# Patient Record
Sex: FEMALE | Race: OTHER | HISPANIC OR LATINO | URBAN - METROPOLITAN AREA
[De-identification: names, ages, dates, MRNs, and addresses within clinical notes are randomized per-mention and may not be internally consistent; named-entity substitution may affect disease eponyms.]

---

## 2017-03-21 ENCOUNTER — EMERGENCY (EMERGENCY)
Facility: HOSPITAL | Age: 62
LOS: 1 days | Discharge: HOME | End: 2017-03-21
Attending: EMERGENCY MEDICINE
Payer: COMMERCIAL

## 2017-03-21 VITALS
WEIGHT: 199.96 LBS | OXYGEN SATURATION: 97 % | RESPIRATION RATE: 18 BRPM | DIASTOLIC BLOOD PRESSURE: 117 MMHG | HEART RATE: 66 BPM | SYSTOLIC BLOOD PRESSURE: 160 MMHG | HEIGHT: 57 IN | TEMPERATURE: 98 F

## 2017-03-21 DIAGNOSIS — R21 RASH AND OTHER NONSPECIFIC SKIN ERUPTION: ICD-10-CM

## 2017-03-21 DIAGNOSIS — L03.032 CELLULITIS OF LEFT TOE: ICD-10-CM

## 2017-03-21 DIAGNOSIS — Z79.82 LONG TERM (CURRENT) USE OF ASPIRIN: ICD-10-CM

## 2017-03-21 LAB
ANION GAP SERPL CALC-SCNC: 12 MMOL/L — SIGNIFICANT CHANGE UP (ref 5–17)
BUN SERPL-MCNC: 11 MG/DL — SIGNIFICANT CHANGE UP (ref 8–20)
CALCIUM SERPL-MCNC: 9.2 MG/DL — SIGNIFICANT CHANGE UP (ref 8.6–10.2)
CHLORIDE SERPL-SCNC: 101 MMOL/L — SIGNIFICANT CHANGE UP (ref 98–107)
CO2 SERPL-SCNC: 27 MMOL/L — SIGNIFICANT CHANGE UP (ref 22–29)
CREAT SERPL-MCNC: 0.59 MG/DL — SIGNIFICANT CHANGE UP (ref 0.5–1.3)
GLUCOSE SERPL-MCNC: 102 MG/DL — SIGNIFICANT CHANGE UP (ref 70–115)
HCT VFR BLD CALC: 39.2 % — SIGNIFICANT CHANGE UP (ref 37–47)
HGB BLD-MCNC: 13 G/DL — SIGNIFICANT CHANGE UP (ref 12–16)
MCHC RBC-ENTMCNC: 28.6 PG — SIGNIFICANT CHANGE UP (ref 27–31)
MCHC RBC-ENTMCNC: 33.2 G/DL — SIGNIFICANT CHANGE UP (ref 32–36)
MCV RBC AUTO: 86.2 FL — SIGNIFICANT CHANGE UP (ref 81–99)
PLATELET # BLD AUTO: 256 K/UL — SIGNIFICANT CHANGE UP (ref 150–400)
POTASSIUM SERPL-MCNC: 4.1 MMOL/L — SIGNIFICANT CHANGE UP (ref 3.5–5.3)
POTASSIUM SERPL-SCNC: 4.1 MMOL/L — SIGNIFICANT CHANGE UP (ref 3.5–5.3)
RBC # BLD: 4.55 M/UL — SIGNIFICANT CHANGE UP (ref 4.4–5.2)
RBC # FLD: 13.3 % — SIGNIFICANT CHANGE UP (ref 11–15.6)
SODIUM SERPL-SCNC: 140 MMOL/L — SIGNIFICANT CHANGE UP (ref 135–145)
WBC # BLD: 11.9 K/UL — HIGH (ref 4.8–10.8)
WBC # FLD AUTO: 11.9 K/UL — HIGH (ref 4.8–10.8)

## 2017-03-21 PROCEDURE — 80048 BASIC METABOLIC PNL TOTAL CA: CPT

## 2017-03-21 PROCEDURE — 85027 COMPLETE CBC AUTOMATED: CPT

## 2017-03-21 PROCEDURE — 99284 EMERGENCY DEPT VISIT MOD MDM: CPT | Mod: 25

## 2017-03-21 PROCEDURE — 99284 EMERGENCY DEPT VISIT MOD MDM: CPT

## 2017-03-21 PROCEDURE — 96375 TX/PRO/DX INJ NEW DRUG ADDON: CPT

## 2017-03-21 PROCEDURE — 96374 THER/PROPH/DIAG INJ IV PUSH: CPT

## 2017-03-21 RX ORDER — CEFAZOLIN SODIUM 1 G
2000 VIAL (EA) INJECTION ONCE
Qty: 0 | Refills: 0 | Status: COMPLETED | OUTPATIENT
Start: 2017-03-21 | End: 2017-03-21

## 2017-03-21 RX ORDER — KETOROLAC TROMETHAMINE 30 MG/ML
30 SYRINGE (ML) INJECTION ONCE
Qty: 0 | Refills: 0 | Status: DISCONTINUED | OUTPATIENT
Start: 2017-03-21 | End: 2017-03-21

## 2017-03-21 RX ORDER — SODIUM CHLORIDE 9 MG/ML
3 INJECTION INTRAMUSCULAR; INTRAVENOUS; SUBCUTANEOUS ONCE
Qty: 0 | Refills: 0 | Status: COMPLETED | OUTPATIENT
Start: 2017-03-21 | End: 2017-03-21

## 2017-03-21 RX ORDER — IBUPROFEN 200 MG
1 TABLET ORAL
Qty: 16 | Refills: 0 | OUTPATIENT
Start: 2017-03-21 | End: 2017-03-25

## 2017-03-21 RX ORDER — CEPHALEXIN 500 MG
1 CAPSULE ORAL
Qty: 21 | Refills: 0 | OUTPATIENT
Start: 2017-03-21 | End: 2017-03-28

## 2017-03-21 RX ADMIN — SODIUM CHLORIDE 3 MILLILITER(S): 9 INJECTION INTRAMUSCULAR; INTRAVENOUS; SUBCUTANEOUS at 22:57

## 2017-03-21 RX ADMIN — Medication 100 MILLIGRAM(S): at 22:57

## 2017-03-21 RX ADMIN — Medication 30 MILLIGRAM(S): at 22:57

## 2017-03-21 RX ADMIN — Medication 30 MILLIGRAM(S): at 22:58

## 2017-03-21 NOTE — ED STATDOCS - NS ED MD SCRIBE ATTENDING SCRIBE SECTIONS
DISPOSITION/VITAL SIGNS( Pullset)/REVIEW OF SYSTEMS/PHYSICAL EXAM/HIV/HISTORY OF PRESENT ILLNESS/PAST MEDICAL/SURGICAL/SOCIAL HISTORY/INTAKE ASSESSMENT/SCREENINGS

## 2017-03-21 NOTE — ED STATDOCS - MUSCULOSKELETAL, MLM
Left foot with fungal nails. Heat and redness to left 4th toe extending upwards to left ankle. Lateral malleolus is swollen and tender.

## 2017-03-21 NOTE — ED STATDOCS - MEDICAL DECISION MAKING DETAILS
Pt with cellulitis to foot that is beginning to ascend to ankle. Will check labs and give IV abx. Encourage elevation to decrease swelling.

## 2017-03-21 NOTE — ED STATDOCS - OBJECTIVE STATEMENT
61 y/o F presents to the ED complaining of rash. Pt had itch to her left toes this afternoon and noted increased itching, swelling and redness to toes and left foot. She states pain radiates from left foot upwards to LLE. Pt denies new medications, recent falls, recent trauma, and animal bites. No hx of DM. NKDA. Pt was recently dx with vaginal infection and was scheduled for US today.   PCP: Dr. Funes

## 2017-03-21 NOTE — ED STATDOCS - PROGRESS NOTE DETAILS
HPI, ROS, PE done by intake doc. Orders/Plan reviewed. Pt presents today with itching, swelling and redness to toes and left foot. She states pain radiates from left foot upwards to LLE. Pt denies new medications, recent falls, recent trauma, fever, chills, nausea and animal bites. No hx of DM.  PE- Well developed, well-nourish, resting comfortably in NAD. MS: Left foot with fungal nails. Heat and redness to left 4th toe extending upwards to left ankle. Lateral malleolus is swollen and tender. Neuro: intact without focal deficits, A&Ox3, no gross sensory or motor deficits. Plan: Will f/u plan per intake physician. HPI, ROS, PE done by intake doc. Orders/Plan reviewed. Pt presents today with itching, swelling and redness to toes and left foot. She states pain radiates from left foot upwards to LLE. Pt denies new medications, recent falls, recent trauma, fever, chills, nausea and animal bites. No hx of DM.  PE- Well developed, well-nourish, resting comfortably in NAD. MS: Left foot with fungal nails. Heat and redness to left 4th toe extending upwards to left ankle. Lateral malleolus is swollen and tender. No calf tenderness. Dp and pt pulses intact, cap refill  < 2 secs. Gait intact. Neuro: intact without focal deficits, A&Ox3, no gross sensory or motor deficits. Plan: Will f/u plan per intake physician. Counseled pt and family on results. Motrin. Keflex. Area marked with marking pen. F/u with pmd within 48 hours. HPI, ROS, PE done by intake doc. Orders/Plan reviewed. Pt presents today with itching, swelling and redness to toes and left foot. She states pain radiates from left foot upwards to LLE. Pt denies new medications, recent falls, recent trauma, fever, chills, sob, cp, hp, nausea and animal bites. No hx of DM.  PE- Well developed, well-nourish, resting comfortably in NAD. MS: Left foot with fungal nails. Heat and redness to left 4th toe extending upwards to left ankle. Lateral malleolus is swollen and tender. No calf tenderness. Dp and pt pulses intact, cap refill  < 2 secs. Gait intact. Neuro: intact without focal deficits, A&Ox3, no gross sensory or motor deficits. Plan: Will f/u plan per intake physician.

## 2017-03-21 NOTE — ED STATDOCS - ATTENDING CONTRIBUTION TO CARE
I, Indigo Kaur, performed the initial face to face bedside interview with this patient regarding history of present illness, review of symptoms and relevant past medical, social and family history.  I completed an independent physical examination.  I was the initial provider who evaluated this patient. I have signed out the follow up of any pending tests (i.e. labs, radiological studies) to the ACP.  I have communicated the patient’s plan of care and disposition with the ACP.  The history, relevant review of systems, past medical and surgical history, medical decision making, and physical examination was documented by the scribe in my presence and I attest to the accuracy of the documentation.

## 2017-06-14 NOTE — ED STATDOCS - CROS ED CARDIOVAS ALL NEG
HPI Comments: Deepali Zimmer is a 34 y.o. female presents to emergency room ambulatory with  who will be interpreting from [facility] [place] to [place] at patient's request, is c/o 8/10 headache, diffuse neck, mid and lower back pain as well as vomiting x 1 this morning after a heavy door that was propped against a door fell onto her from the side, first hitting her head and her falling onto the ground on her side, with the door falling on her. The incident occurred on  while at work pushing a cart into a room when the door that was not propped against the wall, fell on her. She states she was under the door for 2 minutes at most, denies LOC and at time of event only had a dull headache. She states the next day her neck and back began to become sore and notes this morning she had 1 episode of vomiting. She denies F/C, ABD pain, CP, SOB / difficulty breathing, numbness/tingling, bowel/bladder dysfunction, hip pain, hematuria / dysuria or flank pain. Took ibuprofen with little relief. LMP- 17, denies pregnancy chance    PCP: No primary care provider on file. PMH- none  Surgical hx- c-setion    The patient has no other complaints at this time. Patient is a 34 y.o. female presenting with back pain. The history is provided by the spouse and the patient. A  was used (patient's - per patients request). Back Pain    Pertinent negatives include no chest pain, no numbness, no headaches, no abdominal pain and no dysuria. History reviewed. No pertinent past medical history. Past Surgical History:   Procedure Laterality Date    HX  SECTION      x1         History reviewed. No pertinent family history. Social History     Social History    Marital status:      Spouse name: N/A    Number of children: N/A    Years of education: N/A     Occupational History    Not on file.      Social History Main Topics    Smoking status: Never Smoker    Smokeless tobacco: Not on file    Alcohol use No    Drug use: No    Sexual activity: Not on file     Other Topics Concern    Not on file     Social History Narrative    No narrative on file         ALLERGIES: Review of patient's allergies indicates no known allergies. Review of Systems   Constitutional: Negative. Negative for activity change, chills, fatigue and unexpected weight change. Respiratory: Negative for cough, chest tightness, shortness of breath and wheezing. Cardiovascular: Negative. Negative for chest pain and palpitations. Gastrointestinal: Negative. Negative for abdominal pain, diarrhea, nausea and vomiting. Genitourinary: Negative. Negative for dysuria, flank pain, frequency and hematuria. Musculoskeletal: Positive for back pain and neck pain. Negative for arthralgias. Skin: Negative. Negative for color change and rash. Neurological: Negative. Negative for dizziness, numbness and headaches. Psychiatric/Behavioral: Negative. Negative for confusion. All other systems reviewed and are negative. Vitals:    06/14/17 1820   BP: 128/71   Pulse: 68   Resp: 16   Temp: 97.7 °F (36.5 °C)   SpO2: 100%   Weight: 61.2 kg (135 lb)   Height: 4' 11\" (1.499 m)            Physical Exam   Constitutional: She is oriented to person, place, and time. She appears well-developed and well-nourished. She is active. Non-toxic appearance. No distress. HENT:   Head: Normocephalic and atraumatic. Eyes: Conjunctivae are normal. Pupils are equal, round, and reactive to light. Right eye exhibits no discharge. Left eye exhibits no discharge. Neck: Normal range of motion and full passive range of motion without pain. Neck supple. No tracheal tenderness present. Diffuse c-spine, mid-lower t-spine and diffuse l-spine TTP. Also with R > L paracervical and trap TTP. Cardiovascular: Normal rate, regular rhythm, normal heart sounds, intact distal pulses and normal pulses.   Exam reveals no gallop and no friction rub. No murmur heard. Pulmonary/Chest: Effort normal and breath sounds normal. No respiratory distress. She has no wheezes. She has no rales. She exhibits no tenderness. Abdominal: Soft. Bowel sounds are normal. She exhibits no distension. There is no tenderness. There is no rebound and no guarding. Musculoskeletal: Normal range of motion. She exhibits no edema or tenderness. Neurological: She is alert and oriented to person, place, and time. She has normal strength. No cranial nerve deficit or sensory deficit. Coordination normal.   Strength 5/5 throughout without focal neuro deficits   Skin: Skin is warm, dry and intact. No abrasion and no rash noted. She is not diaphoretic. No erythema. No ecchymosis   Psychiatric: She has a normal mood and affect. Her speech is normal and behavior is normal. Cognition and memory are normal.   Nursing note and vitals reviewed. MDM  Number of Diagnoses or Management Options  Diagnosis management comments:   Ddx: frx, sprain, ICH, contusion, pregnancy       Amount and/or Complexity of Data Reviewed  Clinical lab tests: ordered and reviewed  Tests in the radiology section of CPT®: ordered and reviewed    Patient Progress  Patient progress: stable    ED Course       Procedures    Patient feels better after Flexeril and ibuprofen. Discussed all radiology and lab findings and all questions answered. Ranjeet Green PA-C         DISCHARGE NOTE:  21:28  The patient's results have been reviewed with them and/or available family. Patient and/or family verbally conveyed their understanding and agreement of the patient's signs, symptoms, diagnosis, treatment and prognosis and additionally agree to follow up as recommended in the discharge instructions or to return to the Emergency Room should their condition change prior to their follow-up appointment.  The patient/family verbally agrees with the care-plan and verbally conveys that all of their questions have been answered. The discharge instructions have also been provided to the patient and/or family with some educational information regarding the patient's diagnosis as well a list of reasons why the patient would want to return to the ER prior to their follow-up appointment, should their condition change. LABS COMPLETED AND REVIEWED:  Recent Results (from the past 12 hour(s))   HCG URINE, QL. - POC    Collection Time: 06/14/17  7:46 PM   Result Value Ref Range    Pregnancy test,urine (POC) NEGATIVE  NEG     URINALYSIS W/MICROSCOPIC    Collection Time: 06/14/17  8:26 PM   Result Value Ref Range    Color YELLOW/STRAW      Appearance CLOUDY (A) CLEAR      Specific gravity 1.015 1.003 - 1.030      pH (UA) 7.5 5.0 - 8.0      Protein NEGATIVE  NEG mg/dL    Glucose NEGATIVE  NEG mg/dL    Ketone NEGATIVE  NEG mg/dL    Bilirubin NEGATIVE  NEG      Blood SMALL (A) NEG      Urobilinogen 0.2 0.2 - 1.0 EU/dL    Nitrites NEGATIVE  NEG      Leukocyte Esterase TRACE (A) NEG      WBC 0-4 0 - 4 /hpf    RBC 0-5 0 - 5 /hpf    Epithelial cells FEW FEW /lpf    Bacteria 1+ (A) NEG /hpf    Hyaline cast 0-2 0 - 5 /lpf         CLINICAL IMPRESSION:  1. CHI (closed head injury), initial encounter    2. Back strain, initial encounter        Plan:  1. Head injury precautions given  2. Rx Flexeril; continue ibuprofen   3. Warm compresses, gentle stretches  4.  Return to ER if worse negative...

## 2018-04-26 ENCOUNTER — EMERGENCY (EMERGENCY)
Facility: HOSPITAL | Age: 63
LOS: 1 days | Discharge: HOME | End: 2018-04-26
Attending: EMERGENCY MEDICINE
Payer: COMMERCIAL

## 2018-04-26 VITALS
RESPIRATION RATE: 19 BRPM | SYSTOLIC BLOOD PRESSURE: 135 MMHG | TEMPERATURE: 97 F | HEART RATE: 66 BPM | DIASTOLIC BLOOD PRESSURE: 68 MMHG | OXYGEN SATURATION: 98 %

## 2018-04-26 VITALS — HEIGHT: 57 IN | WEIGHT: 199.96 LBS

## 2018-04-26 PROCEDURE — 73562 X-RAY EXAM OF KNEE 3: CPT | Mod: 26,RT

## 2018-04-26 PROCEDURE — 93970 EXTREMITY STUDY: CPT

## 2018-04-26 PROCEDURE — 99284 EMERGENCY DEPT VISIT MOD MDM: CPT | Mod: 25

## 2018-04-26 PROCEDURE — 93970 EXTREMITY STUDY: CPT | Mod: 26

## 2018-04-26 PROCEDURE — T1013: CPT

## 2018-04-26 PROCEDURE — 96372 THER/PROPH/DIAG INJ SC/IM: CPT

## 2018-04-26 PROCEDURE — 99284 EMERGENCY DEPT VISIT MOD MDM: CPT

## 2018-04-26 PROCEDURE — 73562 X-RAY EXAM OF KNEE 3: CPT

## 2018-04-26 RX ORDER — KETOROLAC TROMETHAMINE 30 MG/ML
60 SYRINGE (ML) INJECTION ONCE
Qty: 0 | Refills: 0 | Status: DISCONTINUED | OUTPATIENT
Start: 2018-04-26 | End: 2018-04-26

## 2018-04-26 RX ADMIN — Medication 60 MILLIGRAM(S): at 22:04

## 2018-04-26 NOTE — ED ADULT TRIAGE NOTE - CHIEF COMPLAINT QUOTE
Pt c/o pain to B/L knees x's 3 weeks, pt states "I got medicine 3 weeks ago and it hasn't been helping", denies trauma/fall, c/o swelling to right knee

## 2018-04-26 NOTE — ED ADULT NURSE NOTE - OBJECTIVE STATEMENT
Pt axox3 c/o b/l knee pain  x 3 weeks. Pt denies recent injury to sites. Pt states her primary had sent her to physical therapy but is having issues with insurance. Pt able to ambulate with cane.

## 2018-04-27 RX ORDER — IBUPROFEN 200 MG
1 TABLET ORAL
Qty: 16 | Refills: 0 | OUTPATIENT
Start: 2018-04-27 | End: 2018-04-30

## 2018-04-27 NOTE — ED PROVIDER NOTE - OBJECTIVE STATEMENT
62 y/o F c/o right knee pain and swelling x 3 weeks.  Patient denies trauma, injury, difficulty breathing, chest pain.  Patient is able to walk.

## 2018-04-27 NOTE — ED PROVIDER NOTE - ATTENDING CONTRIBUTION TO CARE
Pt. FROM of b/l knee. Mild tenderness to b/l knee. x-ray reviewed. I have discussed the plan with the ACP.

## 2018-05-11 ENCOUNTER — APPOINTMENT (OUTPATIENT)
Dept: ORTHOPEDIC SURGERY | Facility: CLINIC | Age: 63
End: 2018-05-11
Payer: MEDICAID

## 2018-05-11 VITALS
DIASTOLIC BLOOD PRESSURE: 79 MMHG | BODY MASS INDEX: 39.11 KG/M2 | HEART RATE: 61 BPM | HEIGHT: 59 IN | SYSTOLIC BLOOD PRESSURE: 125 MMHG | WEIGHT: 194 LBS

## 2018-05-11 PROCEDURE — 73562 X-RAY EXAM OF KNEE 3: CPT | Mod: LT

## 2018-05-11 PROCEDURE — 99204 OFFICE O/P NEW MOD 45 MIN: CPT | Mod: 25

## 2018-05-11 PROCEDURE — 20610 DRAIN/INJ JOINT/BURSA W/O US: CPT | Mod: RT

## 2018-07-09 ENCOUNTER — EMERGENCY (EMERGENCY)
Facility: HOSPITAL | Age: 63
LOS: 1 days | Discharge: HOME | End: 2018-07-09
Attending: EMERGENCY MEDICINE
Payer: COMMERCIAL

## 2018-07-09 VITALS
OXYGEN SATURATION: 95 % | SYSTOLIC BLOOD PRESSURE: 135 MMHG | WEIGHT: 199.96 LBS | RESPIRATION RATE: 17 BRPM | HEIGHT: 57 IN | HEART RATE: 71 BPM | DIASTOLIC BLOOD PRESSURE: 76 MMHG | TEMPERATURE: 99 F

## 2018-07-09 PROCEDURE — 90471 IMMUNIZATION ADMIN: CPT

## 2018-07-09 PROCEDURE — 73610 X-RAY EXAM OF ANKLE: CPT | Mod: 26,RT

## 2018-07-09 PROCEDURE — 12002 RPR S/N/AX/GEN/TRNK2.6-7.5CM: CPT

## 2018-07-09 PROCEDURE — 90715 TDAP VACCINE 7 YRS/> IM: CPT

## 2018-07-09 PROCEDURE — 73610 X-RAY EXAM OF ANKLE: CPT

## 2018-07-09 PROCEDURE — 99283 EMERGENCY DEPT VISIT LOW MDM: CPT | Mod: 25

## 2018-07-09 RX ORDER — TETANUS TOXOID, REDUCED DIPHTHERIA TOXOID AND ACELLULAR PERTUSSIS VACCINE, ADSORBED 5; 2.5; 8; 8; 2.5 [IU]/.5ML; [IU]/.5ML; UG/.5ML; UG/.5ML; UG/.5ML
0.5 SUSPENSION INTRAMUSCULAR ONCE
Qty: 0 | Refills: 0 | Status: COMPLETED | OUTPATIENT
Start: 2018-07-09 | End: 2018-07-09

## 2018-07-09 RX ORDER — IBUPROFEN 200 MG
1 TABLET ORAL
Qty: 20 | Refills: 0
Start: 2018-07-09 | End: 2018-07-13

## 2018-07-09 RX ADMIN — TETANUS TOXOID, REDUCED DIPHTHERIA TOXOID AND ACELLULAR PERTUSSIS VACCINE, ADSORBED 0.5 MILLILITER(S): 5; 2.5; 8; 8; 2.5 SUSPENSION INTRAMUSCULAR at 14:00

## 2018-07-09 NOTE — ED PROVIDER NOTE - OBJECTIVE STATEMENT
63 year old female with PMH HTn presents following fall. States she fell and struck her foot on glass coffee table, breaking glass and sustaining a cut. No numbness, tingling, weakness, fever, chills, chest pain, syncope. Did not hit head and no LOC, neck pain, numbness, tingling, weakness.

## 2018-07-09 NOTE — ED PROVIDER NOTE - MEDICAL DECISION MAKING DETAILS
Neuerovasculr intact, full ROM. Good hemostasis and cosmesis. Did nto hit head, no LOC, no neuro deficits and it was mechanical fall. Stable for dc

## 2018-07-09 NOTE — ED ADULT TRIAGE NOTE - CHIEF COMPLAINT QUOTE
patient states the glass top of a pot fell shattered and cut her right foot. patient states she became nauseous and vomited x 1 when she seen the blood.

## 2018-07-09 NOTE — ED PROVIDER NOTE - SKIN, MLM
Skin normal color for race, warm, dry and intact. No evidence of rash. 4 cm laceration just distal to medial; maleolus. Skin warm, well perfused, senation intact

## 2018-07-26 ENCOUNTER — EMERGENCY (EMERGENCY)
Facility: HOSPITAL | Age: 63
LOS: 1 days | Discharge: HOME | End: 2018-07-26
Attending: EMERGENCY MEDICINE
Payer: COMMERCIAL

## 2018-07-26 VITALS
HEART RATE: 78 BPM | DIASTOLIC BLOOD PRESSURE: 82 MMHG | OXYGEN SATURATION: 98 % | TEMPERATURE: 98 F | RESPIRATION RATE: 17 BRPM | SYSTOLIC BLOOD PRESSURE: 151 MMHG

## 2018-07-26 PROCEDURE — T1013: CPT

## 2018-07-26 PROCEDURE — G0463: CPT

## 2018-07-26 NOTE — ED ADULT NURSE NOTE - CHPI ED SYMPTOMS NEG
no red streaks/no redness/no pain/no bleeding/no fever/no inflammation/no drainage/no bleeding at site/no purulent drainage

## 2018-07-26 NOTE — ED PROVIDER NOTE - OBJECTIVE STATEMENT
64 yo female hx of htn presents for suture removal; sutures placed to right lateral foot/ankle appriox 2 weeks ago; states she planned to come back sooner to have sutures removed, but developed an "infection" for which her PMD placed her antibiotics, and now "it's all better." No complaints of pain, tenderness or drainage

## 2018-09-11 ENCOUNTER — APPOINTMENT (OUTPATIENT)
Dept: ORTHOPEDIC SURGERY | Facility: CLINIC | Age: 63
End: 2018-09-11

## 2018-09-21 ENCOUNTER — APPOINTMENT (OUTPATIENT)
Dept: ORTHOPEDIC SURGERY | Facility: CLINIC | Age: 63
End: 2018-09-21
Payer: MEDICAID

## 2018-09-21 VITALS
WEIGHT: 194 LBS | HEART RATE: 71 BPM | SYSTOLIC BLOOD PRESSURE: 122 MMHG | DIASTOLIC BLOOD PRESSURE: 75 MMHG | BODY MASS INDEX: 39.11 KG/M2 | HEIGHT: 59 IN

## 2018-09-21 DIAGNOSIS — M54.5 LOW BACK PAIN: ICD-10-CM

## 2018-09-21 DIAGNOSIS — M17.12 UNILATERAL PRIMARY OSTEOARTHRITIS, LEFT KNEE: ICD-10-CM

## 2018-09-21 DIAGNOSIS — G89.29 LOW BACK PAIN: ICD-10-CM

## 2018-09-21 DIAGNOSIS — M17.11 UNILATERAL PRIMARY OSTEOARTHRITIS, RIGHT KNEE: ICD-10-CM

## 2018-09-21 PROCEDURE — 99214 OFFICE O/P EST MOD 30 MIN: CPT

## 2018-10-12 ENCOUNTER — APPOINTMENT (OUTPATIENT)
Dept: RHEUMATOLOGY | Facility: CLINIC | Age: 63
End: 2018-10-12
Payer: MEDICAID

## 2018-10-12 VITALS
WEIGHT: 204 LBS | SYSTOLIC BLOOD PRESSURE: 124 MMHG | DIASTOLIC BLOOD PRESSURE: 76 MMHG | HEIGHT: 59 IN | TEMPERATURE: 98.2 F | HEART RATE: 61 BPM | BODY MASS INDEX: 41.12 KG/M2 | OXYGEN SATURATION: 98 % | RESPIRATION RATE: 17 BRPM

## 2018-10-12 DIAGNOSIS — Z87.19 PERSONAL HISTORY OF OTHER DISEASES OF THE DIGESTIVE SYSTEM: ICD-10-CM

## 2018-10-12 DIAGNOSIS — Z78.0 ASYMPTOMATIC MENOPAUSAL STATE: ICD-10-CM

## 2018-10-12 DIAGNOSIS — M25.50 PAIN IN UNSPECIFIED JOINT: ICD-10-CM

## 2018-10-12 DIAGNOSIS — Z63.4 DISAPPEARANCE AND DEATH OF FAMILY MEMBER: ICD-10-CM

## 2018-10-12 DIAGNOSIS — Z80.41 FAMILY HISTORY OF MALIGNANT NEOPLASM OF OVARY: ICD-10-CM

## 2018-10-12 DIAGNOSIS — Z87.898 PERSONAL HISTORY OF OTHER SPECIFIED CONDITIONS: ICD-10-CM

## 2018-10-12 DIAGNOSIS — Z86.69 PERSONAL HISTORY OF OTHER DISEASES OF THE NERVOUS SYSTEM AND SENSE ORGANS: ICD-10-CM

## 2018-10-12 DIAGNOSIS — M25.473 EFFUSION, UNSPECIFIED ANKLE: ICD-10-CM

## 2018-10-12 PROCEDURE — 99205 OFFICE O/P NEW HI 60 MIN: CPT

## 2018-10-12 SDOH — SOCIAL STABILITY - SOCIAL INSECURITY: DISSAPEARANCE AND DEATH OF FAMILY MEMBER: Z63.4

## 2018-10-14 PROBLEM — Z87.898 HISTORY OF URINARY INCONTINENCE: Status: RESOLVED | Noted: 2018-10-12 | Resolved: 2018-10-14

## 2018-10-14 PROBLEM — Z63.4 WIDOWED: Status: ACTIVE | Noted: 2018-10-14

## 2018-10-14 PROBLEM — Z80.41 FAMILY HISTORY OF MALIGNANT NEOPLASM OF OVARY: Status: ACTIVE | Noted: 2018-10-14

## 2018-10-14 PROBLEM — Z78.0 HISTORY OF MENOPAUSE: Status: RESOLVED | Noted: 2018-10-14 | Resolved: 2018-10-14

## 2018-10-14 PROBLEM — M25.473 ANKLE SWELLING: Status: ACTIVE | Noted: 2018-10-14

## 2018-10-14 PROBLEM — Z86.69 HISTORY OF SLEEP APNEA: Status: RESOLVED | Noted: 2018-10-14 | Resolved: 2018-10-14

## 2018-10-14 PROBLEM — Z87.19 HISTORY OF CONSTIPATION: Status: RESOLVED | Noted: 2018-10-12 | Resolved: 2018-10-14

## 2018-10-14 RX ORDER — MELOXICAM 15 MG/1
15 TABLET ORAL DAILY
Qty: 30 | Refills: 0 | Status: DISCONTINUED | COMMUNITY
Start: 2018-06-26 | End: 2018-10-14

## 2018-10-22 ENCOUNTER — CLINICAL ADVICE (OUTPATIENT)
Age: 63
End: 2018-10-22

## 2018-10-31 ENCOUNTER — APPOINTMENT (OUTPATIENT)
Dept: RHEUMATOLOGY | Facility: CLINIC | Age: 63
End: 2018-10-31
Payer: MEDICAID

## 2018-10-31 VITALS
TEMPERATURE: 98.7 F | SYSTOLIC BLOOD PRESSURE: 132 MMHG | BODY MASS INDEX: 41.81 KG/M2 | RESPIRATION RATE: 18 BRPM | WEIGHT: 207 LBS | DIASTOLIC BLOOD PRESSURE: 81 MMHG | OXYGEN SATURATION: 95 % | HEART RATE: 66 BPM

## 2018-10-31 DIAGNOSIS — M54.5 LOW BACK PAIN: ICD-10-CM

## 2018-10-31 DIAGNOSIS — G89.29 LOW BACK PAIN: ICD-10-CM

## 2018-10-31 PROCEDURE — 99214 OFFICE O/P EST MOD 30 MIN: CPT

## 2018-10-31 RX ORDER — ETODOLAC 400 MG/1
400 TABLET, FILM COATED, EXTENDED RELEASE ORAL
Qty: 60 | Refills: 2 | Status: DISCONTINUED | COMMUNITY
Start: 2018-10-14 | End: 2018-10-31

## 2018-11-07 ENCOUNTER — FORM ENCOUNTER (OUTPATIENT)
Age: 63
End: 2018-11-07

## 2019-01-24 ENCOUNTER — EMERGENCY (EMERGENCY)
Facility: HOSPITAL | Age: 64
LOS: 1 days | Discharge: HOME | End: 2019-01-24
Attending: EMERGENCY MEDICINE
Payer: COMMERCIAL

## 2019-01-24 VITALS
HEART RATE: 84 BPM | TEMPERATURE: 98 F | DIASTOLIC BLOOD PRESSURE: 60 MMHG | HEIGHT: 57 IN | SYSTOLIC BLOOD PRESSURE: 117 MMHG | OXYGEN SATURATION: 98 % | RESPIRATION RATE: 20 BRPM | WEIGHT: 210.1 LBS

## 2019-01-24 LAB
ALBUMIN SERPL ELPH-MCNC: 4.4 G/DL — SIGNIFICANT CHANGE UP (ref 3.3–5.2)
ALP SERPL-CCNC: 85 U/L — SIGNIFICANT CHANGE UP (ref 40–120)
ALT FLD-CCNC: 25 U/L — SIGNIFICANT CHANGE UP
ANION GAP SERPL CALC-SCNC: 14 MMOL/L — SIGNIFICANT CHANGE UP (ref 5–17)
APPEARANCE UR: CLEAR — SIGNIFICANT CHANGE UP
AST SERPL-CCNC: 25 U/L — SIGNIFICANT CHANGE UP
BACTERIA # UR AUTO: ABNORMAL
BASOPHILS # BLD AUTO: 0 K/UL — SIGNIFICANT CHANGE UP (ref 0–0.2)
BASOPHILS NFR BLD AUTO: 0.4 % — SIGNIFICANT CHANGE UP (ref 0–2)
BILIRUB SERPL-MCNC: 0.6 MG/DL — SIGNIFICANT CHANGE UP (ref 0.4–2)
BILIRUB UR-MCNC: NEGATIVE — SIGNIFICANT CHANGE UP
BUN SERPL-MCNC: 14 MG/DL — SIGNIFICANT CHANGE UP (ref 8–20)
CALCIUM SERPL-MCNC: 9.3 MG/DL — SIGNIFICANT CHANGE UP (ref 8.6–10.2)
CHLORIDE SERPL-SCNC: 102 MMOL/L — SIGNIFICANT CHANGE UP (ref 98–107)
CO2 SERPL-SCNC: 25 MMOL/L — SIGNIFICANT CHANGE UP (ref 22–29)
COLOR SPEC: YELLOW — SIGNIFICANT CHANGE UP
CREAT SERPL-MCNC: 0.75 MG/DL — SIGNIFICANT CHANGE UP (ref 0.5–1.3)
DIFF PNL FLD: ABNORMAL
EOSINOPHIL # BLD AUTO: 0.3 K/UL — SIGNIFICANT CHANGE UP (ref 0–0.5)
EOSINOPHIL NFR BLD AUTO: 2.1 % — SIGNIFICANT CHANGE UP (ref 0–6)
EPI CELLS # UR: SIGNIFICANT CHANGE UP
GLUCOSE SERPL-MCNC: 96 MG/DL — SIGNIFICANT CHANGE UP (ref 70–115)
GLUCOSE UR QL: NEGATIVE MG/DL — SIGNIFICANT CHANGE UP
HCT VFR BLD CALC: 38.3 % — SIGNIFICANT CHANGE UP (ref 37–47)
HGB BLD-MCNC: 12.4 G/DL — SIGNIFICANT CHANGE UP (ref 12–16)
KETONES UR-MCNC: NEGATIVE — SIGNIFICANT CHANGE UP
LEUKOCYTE ESTERASE UR-ACNC: ABNORMAL
LIDOCAIN IGE QN: 18 U/L — LOW (ref 22–51)
LYMPHOCYTES # BLD AUTO: 27.6 % — SIGNIFICANT CHANGE UP (ref 20–55)
LYMPHOCYTES # BLD AUTO: 3.7 K/UL — SIGNIFICANT CHANGE UP (ref 1–4.8)
MCHC RBC-ENTMCNC: 27.7 PG — SIGNIFICANT CHANGE UP (ref 27–31)
MCHC RBC-ENTMCNC: 32.4 G/DL — SIGNIFICANT CHANGE UP (ref 32–36)
MCV RBC AUTO: 85.5 FL — SIGNIFICANT CHANGE UP (ref 81–99)
MONOCYTES # BLD AUTO: 1.3 K/UL — HIGH (ref 0–0.8)
MONOCYTES NFR BLD AUTO: 9.8 % — SIGNIFICANT CHANGE UP (ref 3–10)
NEUTROPHILS # BLD AUTO: 8.1 K/UL — HIGH (ref 1.8–8)
NEUTROPHILS NFR BLD AUTO: 59.7 % — SIGNIFICANT CHANGE UP (ref 37–73)
NITRITE UR-MCNC: NEGATIVE — SIGNIFICANT CHANGE UP
PH UR: 5 — SIGNIFICANT CHANGE UP (ref 5–8)
PLATELET # BLD AUTO: 337 K/UL — SIGNIFICANT CHANGE UP (ref 150–400)
POTASSIUM SERPL-MCNC: 4.1 MMOL/L — SIGNIFICANT CHANGE UP (ref 3.5–5.3)
POTASSIUM SERPL-SCNC: 4.1 MMOL/L — SIGNIFICANT CHANGE UP (ref 3.5–5.3)
PROT SERPL-MCNC: 8.2 G/DL — SIGNIFICANT CHANGE UP (ref 6.6–8.7)
PROT UR-MCNC: 15 MG/DL
RBC # BLD: 4.48 M/UL — SIGNIFICANT CHANGE UP (ref 4.4–5.2)
RBC # FLD: 14 % — SIGNIFICANT CHANGE UP (ref 11–15.6)
RBC CASTS # UR COMP ASSIST: ABNORMAL /HPF (ref 0–4)
SODIUM SERPL-SCNC: 141 MMOL/L — SIGNIFICANT CHANGE UP (ref 135–145)
SP GR SPEC: 1.02 — SIGNIFICANT CHANGE UP (ref 1.01–1.02)
UROBILINOGEN FLD QL: NEGATIVE MG/DL — SIGNIFICANT CHANGE UP
WBC # BLD: 13.5 K/UL — HIGH (ref 4.8–10.8)
WBC # FLD AUTO: 13.5 K/UL — HIGH (ref 4.8–10.8)
WBC UR QL: ABNORMAL

## 2019-01-24 PROCEDURE — 81001 URINALYSIS AUTO W/SCOPE: CPT

## 2019-01-24 PROCEDURE — 80053 COMPREHEN METABOLIC PANEL: CPT

## 2019-01-24 PROCEDURE — 99284 EMERGENCY DEPT VISIT MOD MDM: CPT

## 2019-01-24 PROCEDURE — 74176 CT ABD & PELVIS W/O CONTRAST: CPT | Mod: 26

## 2019-01-24 PROCEDURE — 36415 COLL VENOUS BLD VENIPUNCTURE: CPT

## 2019-01-24 PROCEDURE — 85027 COMPLETE CBC AUTOMATED: CPT

## 2019-01-24 PROCEDURE — 99284 EMERGENCY DEPT VISIT MOD MDM: CPT | Mod: 25

## 2019-01-24 PROCEDURE — 83690 ASSAY OF LIPASE: CPT

## 2019-01-24 PROCEDURE — 87086 URINE CULTURE/COLONY COUNT: CPT

## 2019-01-24 PROCEDURE — 74176 CT ABD & PELVIS W/O CONTRAST: CPT

## 2019-01-24 PROCEDURE — T1013: CPT

## 2019-01-24 PROCEDURE — 96374 THER/PROPH/DIAG INJ IV PUSH: CPT

## 2019-01-24 RX ORDER — SODIUM CHLORIDE 9 MG/ML
3 INJECTION INTRAMUSCULAR; INTRAVENOUS; SUBCUTANEOUS ONCE
Qty: 0 | Refills: 0 | Status: COMPLETED | OUTPATIENT
Start: 2019-01-24 | End: 2019-01-24

## 2019-01-24 RX ORDER — KETOROLAC TROMETHAMINE 30 MG/ML
30 SYRINGE (ML) INJECTION ONCE
Qty: 0 | Refills: 0 | Status: DISCONTINUED | OUTPATIENT
Start: 2019-01-24 | End: 2019-01-24

## 2019-01-24 RX ORDER — IBUPROFEN 200 MG
1 TABLET ORAL
Qty: 20 | Refills: 0 | OUTPATIENT
Start: 2019-01-24 | End: 2019-01-28

## 2019-01-24 RX ORDER — IBUPROFEN 200 MG
600 TABLET ORAL ONCE
Qty: 0 | Refills: 0 | Status: COMPLETED | OUTPATIENT
Start: 2019-01-24 | End: 2019-01-24

## 2019-01-24 RX ADMIN — Medication 30 MILLIGRAM(S): at 19:01

## 2019-01-24 RX ADMIN — Medication 30 MILLIGRAM(S): at 18:46

## 2019-01-24 RX ADMIN — Medication 600 MILLIGRAM(S): at 21:32

## 2019-01-24 RX ADMIN — SODIUM CHLORIDE 3 MILLILITER(S): 9 INJECTION INTRAMUSCULAR; INTRAVENOUS; SUBCUTANEOUS at 18:43

## 2019-01-24 NOTE — ED STATDOCS - ATTENDING CONTRIBUTION TO CARE
I, Ihsan Maldonado, performed the initial face to face bedside interview with this patient regarding history of present illness, review of symptoms and relevant past medical, social and family history.  I completed an independent physical examination.  I was the initial provider who evaluated this patient. I have signed out the follow up of any pending tests (i.e. labs, radiological studies) to the ACP.  I have communicated the patient’s plan of care and disposition with the ACP.  The history, relevant review of systems, past medical and surgical history, medical decision making, and physical examination was documented by the scribe in my presence and I attest to the accuracy of the documentation.

## 2019-01-24 NOTE — ED ADULT NURSE NOTE - OBJECTIVE STATEMENT
assumed pt care at 1840. pt states she has had left side abdominal pain with diarrhea for 2 days. Pt denies chest pain, SOB, nausea/vomiting, dizziness, headaches. Pt A&O X4. No s/s of resp. distress noted. Safety maintained. Will continue to monitor

## 2019-01-24 NOTE — ED STATDOCS - MEDICAL DECISION MAKING DETAILS
63 y.o F with two days of left lower abdominal pain. Will check CT-abd/pel, UA, basic labs,  treat for pain and reassess. 63 y.o F with two days of left upper abdominal pain radiating to left flank. Will check CT-abd/pel, UA CBC, CMP,  treat for pain and reassess. 63 y.o F with two days of left upper abdominal pain radiating to left flank. Will check CT-renal stone hunt, UA CBC, CMP, treat for pain and reassess.

## 2019-01-24 NOTE — ED STATDOCS - PROGRESS NOTE DETAILS
PA Note: Pt evaluated by intake physician. HPI/ROS/PE as noted above. Will follow up plan per intake physician and continue to assess patient.   Additionally, no personal or FHx of kidney stones.  PCP: Antonino Rodriguez  Colonoscopy 7 years ago (normal) PA Note: No stone, uncomplicated diverticulitis and UTI. Augmentin with outpatient GI f/u. PA Note: Patient stable for discharge, reporting improvement. Vital signs stable, tolerating PO, ambulatory in ED. Discussed with patient including but not limited to results, proper medication use, and precautions/potential side effects. As discussed, patient to follow up with PMD in 24-48 hours as well as any specialists that may have been discussed/provided. Patient received copies of lab/radiology results to aid in proper outpatient follow up. Advised to seek immediate medical attention for any new/worsening signs/symptoms or concerns. Patient provided ample opportunity to ask questions which were answered to the fullest extent. Patient verbalized understanding and agreement of plan.

## 2019-01-24 NOTE — ED STATDOCS - OBJECTIVE STATEMENT
63 y.o F with PMHx HTN presents to ED c/o left lower abdominal pain for two days radiating to left lower back. Endorses increased urinary frequency. Taking ibuprofen at home with no relief. no hx of UTI, no similar symptoms in the past. Denies fever, chills, dysuria, nausea, vomiting or additional physical complaints at this time. 63 y.o F with PMHx HTN presents to ED c/o left upper abdominal pain for two days radiating to left flank. Endorses increased urinary frequency. Taking ibuprofen at home with no relief. no hx of UTI, no similar symptoms in the past. Denies fever, chills, dysuria, nausea, vomiting or additional physical complaints at this time.

## 2019-01-24 NOTE — ED STATDOCS - GASTROINTESTINAL, MLM
abdomen soft, non-tender, and non-distended. Bowel sounds present. abdomen soft, LUQ tenderness without guarding or rebound, and non-distended. Bowel sounds present.

## 2019-01-24 NOTE — ED STATDOCS - CARE PLAN
Principal Discharge DX:	Diverticulitis  Secondary Diagnosis:	Urinary tract infection with hematuria, site unspecified

## 2019-01-25 ENCOUNTER — APPOINTMENT (OUTPATIENT)
Dept: RHEUMATOLOGY | Facility: CLINIC | Age: 64
End: 2019-01-25

## 2019-01-25 LAB
CULTURE RESULTS: SIGNIFICANT CHANGE UP
SPECIMEN SOURCE: SIGNIFICANT CHANGE UP

## 2019-02-01 ENCOUNTER — APPOINTMENT (OUTPATIENT)
Dept: RHEUMATOLOGY | Facility: CLINIC | Age: 64
End: 2019-02-01
Payer: MEDICARE

## 2019-02-01 VITALS
TEMPERATURE: 98.3 F | OXYGEN SATURATION: 98 % | HEIGHT: 57 IN | HEART RATE: 60 BPM | DIASTOLIC BLOOD PRESSURE: 70 MMHG | RESPIRATION RATE: 17 BRPM | SYSTOLIC BLOOD PRESSURE: 140 MMHG | BODY MASS INDEX: 51.78 KG/M2 | WEIGHT: 240 LBS

## 2019-02-01 DIAGNOSIS — Z86.39 PERSONAL HISTORY OF OTHER ENDOCRINE, NUTRITIONAL AND METABOLIC DISEASE: ICD-10-CM

## 2019-02-01 DIAGNOSIS — Z15.89 GENETIC SUSCEPTIBILITY TO OTHER DISEASE: ICD-10-CM

## 2019-02-01 DIAGNOSIS — M85.80 OTHER SPECIFIED DISORDERS OF BONE DENSITY AND STRUCTURE, UNSPECIFIED SITE: ICD-10-CM

## 2019-02-01 DIAGNOSIS — E55.9 VITAMIN D DEFICIENCY, UNSPECIFIED: ICD-10-CM

## 2019-02-01 DIAGNOSIS — G89.29 PAIN IN RIGHT KNEE: ICD-10-CM

## 2019-02-01 DIAGNOSIS — M25.561 PAIN IN RIGHT KNEE: ICD-10-CM

## 2019-02-01 PROCEDURE — 99214 OFFICE O/P EST MOD 30 MIN: CPT

## 2019-02-03 PROBLEM — Z15.89 HLA B27 POSITIVE: Status: ACTIVE | Noted: 2018-10-31

## 2019-02-03 PROBLEM — Z86.39 HISTORY OF HYPOTHYROIDISM: Status: RESOLVED | Noted: 2019-02-03 | Resolved: 2019-02-03

## 2019-05-03 ENCOUNTER — APPOINTMENT (OUTPATIENT)
Dept: RHEUMATOLOGY | Facility: CLINIC | Age: 64
End: 2019-05-03

## 2019-05-17 ENCOUNTER — FORM ENCOUNTER (OUTPATIENT)
Age: 64
End: 2019-05-17

## 2019-09-01 ENCOUNTER — OUTPATIENT (OUTPATIENT)
Dept: OUTPATIENT SERVICES | Facility: HOSPITAL | Age: 64
LOS: 1 days | End: 2019-09-01

## 2019-09-12 ENCOUNTER — FORM ENCOUNTER (OUTPATIENT)
Age: 64
End: 2019-09-12

## 2019-09-25 ENCOUNTER — FORM ENCOUNTER (OUTPATIENT)
Age: 64
End: 2019-09-25

## 2019-09-26 ENCOUNTER — EMERGENCY (EMERGENCY)
Facility: HOSPITAL | Age: 64
LOS: 1 days | Discharge: HOME | End: 2019-09-26
Attending: EMERGENCY MEDICINE
Payer: COMMERCIAL

## 2019-09-26 VITALS
RESPIRATION RATE: 18 BRPM | HEART RATE: 56 BPM | SYSTOLIC BLOOD PRESSURE: 123 MMHG | TEMPERATURE: 98 F | OXYGEN SATURATION: 96 % | DIASTOLIC BLOOD PRESSURE: 73 MMHG

## 2019-09-26 VITALS
SYSTOLIC BLOOD PRESSURE: 115 MMHG | OXYGEN SATURATION: 99 % | RESPIRATION RATE: 20 BRPM | WEIGHT: 190.04 LBS | HEART RATE: 60 BPM | DIASTOLIC BLOOD PRESSURE: 65 MMHG | HEIGHT: 57 IN | TEMPERATURE: 98 F

## 2019-09-26 LAB
ALBUMIN SERPL ELPH-MCNC: 4.4 G/DL — SIGNIFICANT CHANGE UP (ref 3.3–5.2)
ALP SERPL-CCNC: 71 U/L — SIGNIFICANT CHANGE UP (ref 40–120)
ALT FLD-CCNC: 20 U/L — SIGNIFICANT CHANGE UP
ANION GAP SERPL CALC-SCNC: 10 MMOL/L — SIGNIFICANT CHANGE UP (ref 5–17)
AST SERPL-CCNC: 25 U/L — SIGNIFICANT CHANGE UP
BASOPHILS # BLD AUTO: 0.05 K/UL — SIGNIFICANT CHANGE UP (ref 0–0.2)
BASOPHILS NFR BLD AUTO: 0.7 % — SIGNIFICANT CHANGE UP (ref 0–2)
BILIRUB SERPL-MCNC: 0.6 MG/DL — SIGNIFICANT CHANGE UP (ref 0.4–2)
BUN SERPL-MCNC: 10 MG/DL — SIGNIFICANT CHANGE UP (ref 8–20)
CALCIUM SERPL-MCNC: 9.7 MG/DL — SIGNIFICANT CHANGE UP (ref 8.6–10.2)
CHLORIDE SERPL-SCNC: 104 MMOL/L — SIGNIFICANT CHANGE UP (ref 98–107)
CK SERPL-CCNC: 87 U/L — SIGNIFICANT CHANGE UP (ref 25–170)
CO2 SERPL-SCNC: 27 MMOL/L — SIGNIFICANT CHANGE UP (ref 22–29)
CREAT SERPL-MCNC: 0.71 MG/DL — SIGNIFICANT CHANGE UP (ref 0.5–1.3)
EOSINOPHIL # BLD AUTO: 0.14 K/UL — SIGNIFICANT CHANGE UP (ref 0–0.5)
EOSINOPHIL NFR BLD AUTO: 1.8 % — SIGNIFICANT CHANGE UP (ref 0–6)
GLUCOSE SERPL-MCNC: 93 MG/DL — SIGNIFICANT CHANGE UP (ref 70–115)
HCT VFR BLD CALC: 40.3 % — SIGNIFICANT CHANGE UP (ref 34.5–45)
HGB BLD-MCNC: 12.8 G/DL — SIGNIFICANT CHANGE UP (ref 11.5–15.5)
IMM GRANULOCYTES NFR BLD AUTO: 0.3 % — SIGNIFICANT CHANGE UP (ref 0–1.5)
LYMPHOCYTES # BLD AUTO: 2.13 K/UL — SIGNIFICANT CHANGE UP (ref 1–3.3)
LYMPHOCYTES # BLD AUTO: 28.1 % — SIGNIFICANT CHANGE UP (ref 13–44)
MCHC RBC-ENTMCNC: 27.8 PG — SIGNIFICANT CHANGE UP (ref 27–34)
MCHC RBC-ENTMCNC: 31.8 GM/DL — LOW (ref 32–36)
MCV RBC AUTO: 87.4 FL — SIGNIFICANT CHANGE UP (ref 80–100)
MONOCYTES # BLD AUTO: 0.75 K/UL — SIGNIFICANT CHANGE UP (ref 0–0.9)
MONOCYTES NFR BLD AUTO: 9.9 % — SIGNIFICANT CHANGE UP (ref 2–14)
NEUTROPHILS # BLD AUTO: 4.5 K/UL — SIGNIFICANT CHANGE UP (ref 1.8–7.4)
NEUTROPHILS NFR BLD AUTO: 59.2 % — SIGNIFICANT CHANGE UP (ref 43–77)
PLATELET # BLD AUTO: 245 K/UL — SIGNIFICANT CHANGE UP (ref 150–400)
POTASSIUM SERPL-MCNC: 4.8 MMOL/L — SIGNIFICANT CHANGE UP (ref 3.5–5.3)
POTASSIUM SERPL-SCNC: 4.8 MMOL/L — SIGNIFICANT CHANGE UP (ref 3.5–5.3)
PROT SERPL-MCNC: 8 G/DL — SIGNIFICANT CHANGE UP (ref 6.6–8.7)
RBC # BLD: 4.61 M/UL — SIGNIFICANT CHANGE UP (ref 3.8–5.2)
RBC # FLD: 13.3 % — SIGNIFICANT CHANGE UP (ref 10.3–14.5)
SODIUM SERPL-SCNC: 141 MMOL/L — SIGNIFICANT CHANGE UP (ref 135–145)
TROPONIN T SERPL-MCNC: <0.01 NG/ML — SIGNIFICANT CHANGE UP (ref 0–0.06)
WBC # BLD: 7.59 K/UL — SIGNIFICANT CHANGE UP (ref 3.8–10.5)
WBC # FLD AUTO: 7.59 K/UL — SIGNIFICANT CHANGE UP (ref 3.8–10.5)

## 2019-09-26 PROCEDURE — G0378: CPT

## 2019-09-26 PROCEDURE — 71046 X-RAY EXAM CHEST 2 VIEWS: CPT | Mod: 26

## 2019-09-26 PROCEDURE — 99220: CPT

## 2019-09-26 PROCEDURE — 75574 CT ANGIO HRT W/3D IMAGE: CPT

## 2019-09-26 PROCEDURE — 71046 X-RAY EXAM CHEST 2 VIEWS: CPT

## 2019-09-26 PROCEDURE — T1013: CPT

## 2019-09-26 PROCEDURE — 36415 COLL VENOUS BLD VENIPUNCTURE: CPT

## 2019-09-26 PROCEDURE — 93010 ELECTROCARDIOGRAM REPORT: CPT

## 2019-09-26 PROCEDURE — 84484 ASSAY OF TROPONIN QUANT: CPT

## 2019-09-26 PROCEDURE — 99284 EMERGENCY DEPT VISIT MOD MDM: CPT

## 2019-09-26 PROCEDURE — 93005 ELECTROCARDIOGRAM TRACING: CPT

## 2019-09-26 PROCEDURE — 82550 ASSAY OF CK (CPK): CPT

## 2019-09-26 PROCEDURE — 80053 COMPREHEN METABOLIC PANEL: CPT

## 2019-09-26 PROCEDURE — 85027 COMPLETE CBC AUTOMATED: CPT

## 2019-09-26 PROCEDURE — 75574 CT ANGIO HRT W/3D IMAGE: CPT | Mod: 26

## 2019-09-26 RX ORDER — KETOROLAC TROMETHAMINE 30 MG/ML
15 SYRINGE (ML) INJECTION ONCE
Refills: 0 | Status: DISCONTINUED | OUTPATIENT
Start: 2019-09-26 | End: 2019-10-05

## 2019-09-26 RX ORDER — ASPIRIN/CALCIUM CARB/MAGNESIUM 324 MG
325 TABLET ORAL ONCE
Refills: 0 | Status: COMPLETED | OUTPATIENT
Start: 2019-09-26 | End: 2019-09-26

## 2019-09-26 RX ORDER — DIAZEPAM 5 MG
5 TABLET ORAL ONCE
Refills: 0 | Status: DISCONTINUED | OUTPATIENT
Start: 2019-09-26 | End: 2019-09-26

## 2019-09-26 RX ORDER — LISINOPRIL 2.5 MG/1
10 TABLET ORAL DAILY
Refills: 0 | Status: DISCONTINUED | OUTPATIENT
Start: 2019-09-26 | End: 2019-10-05

## 2019-09-26 RX ADMIN — Medication 325 MILLIGRAM(S): at 09:27

## 2019-09-26 RX ADMIN — Medication 5 MILLIGRAM(S): at 09:27

## 2019-09-26 NOTE — ED STATDOCS - PROGRESS NOTE DETAILS
63 yo female  hx of htn hld  hypothryroid, depressionco worsening intermittent chest pressure, non radiating, dizziness, nausea, sob over last 5d. mild pressure at this time.   PE vss nad cta bl, rrr, abd obese soft nt nd bs pos, no focal neuro signs, no LE edema, no calf tender  MDM cp ekg nsr no acute st t wave changes, pt to be brought to main ER

## 2019-09-26 NOTE — ED PROVIDER NOTE - CLINICAL SUMMARY MEDICAL DECISION MAKING FREE TEXT BOX
plan to check labs, r/o acs, pna, pt most likely appears to have muscle spasm bu has fhx and obesity. will consider cdu for chest pain work up

## 2019-09-26 NOTE — ED ADULT NURSE NOTE - ED STAT RN HANDOFF DETAILS
bedside report given to BLAINE Dawn in observation unit. Pt stable, no s/s of respiratory distress noted. NSR on monitor. Safety maintained.

## 2019-09-26 NOTE — ED CDU PROVIDER INITIAL DAY NOTE - OBJECTIVE STATEMENT
This is a 64 year old female with pmhx or HTN on Lisinopril BIB daughter c/o chest discomfort along L upper chest wall x 1 week intermittently.  She notes has not tried anything for her pain.  She reports does not smoke, does not follow up with cardiologist.  PCP Miladis Blackwell.  She denies any back pain or SOB.  She notes family history of heart disease, father passed at age 70 from MI.

## 2019-09-26 NOTE — ED CDU PROVIDER DISPOSITION NOTE - PATIENT PORTAL LINK FT
You can access the FollowMyHealth Patient Portal offered by [facility] by registering at the following website: [email or web address]. By joining [facility]’s FollowMyHealth portal, you will also be able to view your health information using other applications (apps) compatible with our system.

## 2019-09-26 NOTE — ED ADULT NURSE NOTE - OBJECTIVE STATEMENT
assumed pt care at 0920. Pt A&O X4. States she has had left side chest pain without radiation and nausea & SOB x 6 days. Pt denies any vomiting, diarrhea, blurred vision,  symptoms. No s/s of respiratory distress noted. NSR on monitor. Safety maintained. Will continue to monitor.

## 2019-09-26 NOTE — ED ADULT NURSE NOTE - NSIMPLEMENTINTERV_GEN_ALL_ED
Implemented All Universal Safety Interventions:  [place] to call system. Call bell, personal items and telephone within reach. Instruct patient to call for assistance. Room bathroom lighting operational. Non-slip footwear when patient is off stretcher. Physically safe environment: no spills, clutter or unnecessary equipment. Stretcher in lowest position, wheels locked, appropriate side rails in place.

## 2019-09-26 NOTE — ED CDU PROVIDER DISPOSITION NOTE - NSFOLLOWUPCLINICS_GEN_ALL_ED_FT
[facility]  Cardiology  [address] [place] [address]  Phone: [phone]  Fax:   Follow Up Time:

## 2019-09-26 NOTE — ED ADULT NURSE REASSESSMENT NOTE - NS ED NURSE REASSESS COMMENT FT1
Assumed care of the patient at 1130. Patient transferred to observation unit CDU 10. Patient A&Ox4. No s/s of distress. Patient placed on CM, Sinus dayron on CM. VSS. Patient states intermittent Left sided CP persists but improved since ED arrival. Patient remains NPO for CTA testing. Patient awaiting rpt trop/EKg. PIV patent. Patient in understanding of plan of care. Patient with no further questions for the nurse. Will continue to monitor.

## 2019-09-26 NOTE — ED PROVIDER NOTE - CARDIAC, MLM
Normal rate, regular rhythm.  Heart sounds S1, S2.  No murmurs, rubs or gallops. chest wall tenderness on left side+ on palpation

## 2019-09-26 NOTE — ED CDU PROVIDER INITIAL DAY NOTE - PROGRESS NOTE DETAILS
faxed over CTA form, spoke [facility] radiology tech BMI 41 MD Ildefonso you with CT scan, HR 54

## 2019-09-26 NOTE — ED CDU PROVIDER INITIAL DAY NOTE - ATTENDING CONTRIBUTION TO CARE
I, Eva Leo, performed a face to face bedside interview with this patient regarding history of present illness, review of symptoms and relevant past medical, social and family history.  I completed an independent physical examination. Medical decision making, follow-up on ordered tests (ie labs, radiologic studies) and re-evaluation of the patient's status has been communicated to the ACP.  Disposition of the patient will be based on test outcome and response to ED interventions.    63yo F with HTN since friday intermittent generalized weakness with nausea some dizziness and chest pain, not exertional. no SOB. no cardiac history. last night lasted all night. no pain now. pain worse with touch. not pleuritic. no radiation     patient for serial trop and ct coronary, cards if abnormal otherwise outpt fu

## 2019-09-26 NOTE — ED CDU PROVIDER DISPOSITION NOTE - CLINICAL COURSE
64 year old female with chest pain on L side of upper chest wall reproducible on exam, better with valium, two sets of troponins x 2 negative, EKG shows sinus dayron, patient obese, and h/o HTN, heart score 3, placed in observation had CTA negative for occlusion.  Will refer to [facility] for f/u.  Advise to take NSAIDS q 6 for chest wall pain.

## 2019-09-26 NOTE — ED CDU PROVIDER DISPOSITION NOTE - ATTENDING CONTRIBUTION TO CARE
I, Eva Leo, performed a face to face bedside interview with this patient regarding history of present illness, review of symptoms and relevant past medical, social and family history.  I completed an independent physical examination. Medical decision making, follow-up on ordered tests (ie labs, radiologic studies) and re-evaluation of the patient's status has been communicated to the ACP.  Disposition of the patient will be based on test outcome and response to ED interventions.     trop negative, no ekg changes during stay, ct coronary negative for acut4e disease. TBDC

## 2019-09-26 NOTE — ED PROVIDER NOTE - OBJECTIVE STATEMENT
63 Y/O F with h/o htn p/w chest pain for last 2 days.  Pain was persistent and had intermittent nausea and dizziness. No fever, chills, vomiting, diarrhea, constipation. Never had pain before. No headache, fall or trauma. Non smoker and no fx of cad,

## 2019-09-27 DIAGNOSIS — Z71.89 OTHER SPECIFIED COUNSELING: ICD-10-CM

## 2019-10-07 ENCOUNTER — FORM ENCOUNTER (OUTPATIENT)
Age: 64
End: 2019-10-07

## 2019-11-07 ENCOUNTER — FORM ENCOUNTER (OUTPATIENT)
Age: 64
End: 2019-11-07

## 2019-11-19 ENCOUNTER — FORM ENCOUNTER (OUTPATIENT)
Age: 64
End: 2019-11-19

## 2020-05-25 ENCOUNTER — FORM ENCOUNTER (OUTPATIENT)
Age: 65
End: 2020-05-25

## 2021-03-19 NOTE — ED ADULT NURSE NOTE - PMH
[___ Weeks Post Op] : [unfilled] weeks post op [Clean/Dry/Intact] : clean, dry and intact [Neuro Intact] : an unremarkable neurological exam [Vascular Intact] : ~T peripheral vascular exam normal [Xray (Date:___)] : [unfilled] Xray -  [Doing Well] : is doing well [Excellent Pain Control] : has excellent pain control [No Sign of Infection] : is showing no signs of infection [Chills] : no chills [Fever] : no fever [Healed] : not healed [Erythema] : not erythematous [Discharge] : absent of discharge [de-identified] : 1 week ACDF at C3-C4 and C4-C5 ACDF.  [de-identified] : 53 year old F 1 week S/p C3-C4 and C4-C5 ACDF. The pain in her BL UE and posterior neck is intermittent. There is mild pain with swallowing. These findings are expected at this time.  [de-identified] : constitutional- No acute distress\par neurologic- no LE radiculitis.\par skin- incision dry clean and intact. The incision was well healed. Suture was removed and Dermabond was applied. \par musculoskeletal - motor strength is 5/5.  [de-identified] : Xray of a cervical spine taken 03/19/2021 demonstrates C3-C4 and C4-C5 ACDF, surgical implants appear well positioned.  HTN (hypertension) [de-identified] : 53 year old F 1 week S/p C3-C4 and C4-C5 ACDF.  [de-identified] : We will refill her pain medication at this time. We discussed incisional care. The patient will follow up in three weeks for repeat clinical assessment.

## 2021-03-26 PROBLEM — Z00.00 ENCOUNTER FOR PREVENTIVE HEALTH EXAMINATION: Status: ACTIVE | Noted: 2021-03-26

## 2021-05-13 ENCOUNTER — APPOINTMENT (OUTPATIENT)
Dept: BREAST CENTER | Facility: CLINIC | Age: 66
End: 2021-05-13
Payer: MEDICAID

## 2021-05-13 VITALS
SYSTOLIC BLOOD PRESSURE: 148 MMHG | HEIGHT: 60.63 IN | HEART RATE: 68 BPM | DIASTOLIC BLOOD PRESSURE: 82 MMHG | WEIGHT: 200 LBS | BODY MASS INDEX: 38.25 KG/M2

## 2021-05-18 ENCOUNTER — EMERGENCY (EMERGENCY)
Facility: HOSPITAL | Age: 66
LOS: 1 days | Discharge: HOME | End: 2021-05-18
Attending: EMERGENCY MEDICINE
Payer: COMMERCIAL

## 2021-05-18 VITALS
DIASTOLIC BLOOD PRESSURE: 66 MMHG | OXYGEN SATURATION: 99 % | WEIGHT: 210.1 LBS | HEIGHT: 57 IN | TEMPERATURE: 98 F | RESPIRATION RATE: 20 BRPM | HEART RATE: 75 BPM | SYSTOLIC BLOOD PRESSURE: 142 MMHG

## 2021-05-18 LAB
ALBUMIN SERPL ELPH-MCNC: 4.5 G/DL — SIGNIFICANT CHANGE UP (ref 3.3–5.2)
ALP SERPL-CCNC: 69 U/L — SIGNIFICANT CHANGE UP (ref 40–120)
ALT FLD-CCNC: 29 U/L — SIGNIFICANT CHANGE UP
ANION GAP SERPL CALC-SCNC: 10 MMOL/L — SIGNIFICANT CHANGE UP (ref 5–17)
APPEARANCE UR: CLEAR — SIGNIFICANT CHANGE UP
AST SERPL-CCNC: 27 U/L — SIGNIFICANT CHANGE UP
BASOPHILS # BLD AUTO: 0.13 K/UL — SIGNIFICANT CHANGE UP (ref 0–0.2)
BASOPHILS NFR BLD AUTO: 0.9 % — SIGNIFICANT CHANGE UP (ref 0–2)
BILIRUB SERPL-MCNC: 0.6 MG/DL — SIGNIFICANT CHANGE UP (ref 0.4–2)
BILIRUB UR-MCNC: NEGATIVE — SIGNIFICANT CHANGE UP
BUN SERPL-MCNC: 7 MG/DL — LOW (ref 8–20)
CALCIUM SERPL-MCNC: 9.5 MG/DL — SIGNIFICANT CHANGE UP (ref 8.6–10.2)
CHLORIDE SERPL-SCNC: 100 MMOL/L — SIGNIFICANT CHANGE UP (ref 98–107)
CO2 SERPL-SCNC: 27 MMOL/L — SIGNIFICANT CHANGE UP (ref 22–29)
COLOR SPEC: YELLOW — SIGNIFICANT CHANGE UP
CREAT SERPL-MCNC: 0.62 MG/DL — SIGNIFICANT CHANGE UP (ref 0.5–1.3)
DIFF PNL FLD: ABNORMAL
EOSINOPHIL # BLD AUTO: 0.13 K/UL — SIGNIFICANT CHANGE UP (ref 0–0.5)
EOSINOPHIL NFR BLD AUTO: 0.9 % — SIGNIFICANT CHANGE UP (ref 0–6)
GLUCOSE SERPL-MCNC: 93 MG/DL — SIGNIFICANT CHANGE UP (ref 70–99)
GLUCOSE UR QL: NEGATIVE MG/DL — SIGNIFICANT CHANGE UP
HCT VFR BLD CALC: 42.5 % — SIGNIFICANT CHANGE UP (ref 34.5–45)
HGB BLD-MCNC: 13.8 G/DL — SIGNIFICANT CHANGE UP (ref 11.5–15.5)
KETONES UR-MCNC: NEGATIVE — SIGNIFICANT CHANGE UP
LEUKOCYTE ESTERASE UR-ACNC: NEGATIVE — SIGNIFICANT CHANGE UP
LIDOCAIN IGE QN: 72 U/L — HIGH (ref 22–51)
LYMPHOCYTES # BLD AUTO: 16.5 % — SIGNIFICANT CHANGE UP (ref 13–44)
LYMPHOCYTES # BLD AUTO: 2.3 K/UL — SIGNIFICANT CHANGE UP (ref 1–3.3)
MCHC RBC-ENTMCNC: 28.5 PG — SIGNIFICANT CHANGE UP (ref 27–34)
MCHC RBC-ENTMCNC: 32.5 GM/DL — SIGNIFICANT CHANGE UP (ref 32–36)
MCV RBC AUTO: 87.8 FL — SIGNIFICANT CHANGE UP (ref 80–100)
MONOCYTES # BLD AUTO: 1.32 K/UL — HIGH (ref 0–0.9)
MONOCYTES NFR BLD AUTO: 9.5 % — SIGNIFICANT CHANGE UP (ref 2–14)
NEUTROPHILS # BLD AUTO: 9.92 K/UL — HIGH (ref 1.8–7.4)
NEUTROPHILS NFR BLD AUTO: 70.4 % — SIGNIFICANT CHANGE UP (ref 43–77)
NITRITE UR-MCNC: NEGATIVE — SIGNIFICANT CHANGE UP
PH UR: 8 — SIGNIFICANT CHANGE UP (ref 5–8)
PLATELET # BLD AUTO: 243 K/UL — SIGNIFICANT CHANGE UP (ref 150–400)
POTASSIUM SERPL-MCNC: 4.2 MMOL/L — SIGNIFICANT CHANGE UP (ref 3.5–5.3)
POTASSIUM SERPL-SCNC: 4.2 MMOL/L — SIGNIFICANT CHANGE UP (ref 3.5–5.3)
PROT SERPL-MCNC: 8.3 G/DL — SIGNIFICANT CHANGE UP (ref 6.6–8.7)
PROT UR-MCNC: NEGATIVE MG/DL — SIGNIFICANT CHANGE UP
RBC # BLD: 4.84 M/UL — SIGNIFICANT CHANGE UP (ref 3.8–5.2)
RBC # FLD: 12.9 % — SIGNIFICANT CHANGE UP (ref 10.3–14.5)
SODIUM SERPL-SCNC: 137 MMOL/L — SIGNIFICANT CHANGE UP (ref 135–145)
SP GR SPEC: 1.01 — SIGNIFICANT CHANGE UP (ref 1.01–1.02)
UROBILINOGEN FLD QL: NEGATIVE MG/DL — SIGNIFICANT CHANGE UP
WBC # BLD: 13.91 K/UL — HIGH (ref 3.8–10.5)
WBC # FLD AUTO: 13.91 K/UL — HIGH (ref 3.8–10.5)

## 2021-05-18 PROCEDURE — 76705 ECHO EXAM OF ABDOMEN: CPT

## 2021-05-18 PROCEDURE — 99284 EMERGENCY DEPT VISIT MOD MDM: CPT | Mod: 25

## 2021-05-18 PROCEDURE — 80053 COMPREHEN METABOLIC PANEL: CPT

## 2021-05-18 PROCEDURE — 81001 URINALYSIS AUTO W/SCOPE: CPT

## 2021-05-18 PROCEDURE — 85025 COMPLETE CBC W/AUTO DIFF WBC: CPT

## 2021-05-18 PROCEDURE — 96375 TX/PRO/DX INJ NEW DRUG ADDON: CPT

## 2021-05-18 PROCEDURE — 96374 THER/PROPH/DIAG INJ IV PUSH: CPT

## 2021-05-18 PROCEDURE — 99285 EMERGENCY DEPT VISIT HI MDM: CPT

## 2021-05-18 PROCEDURE — 36415 COLL VENOUS BLD VENIPUNCTURE: CPT

## 2021-05-18 PROCEDURE — 76705 ECHO EXAM OF ABDOMEN: CPT | Mod: 26,RT

## 2021-05-18 PROCEDURE — 83690 ASSAY OF LIPASE: CPT

## 2021-05-18 RX ORDER — SODIUM CHLORIDE 9 MG/ML
1000 INJECTION INTRAMUSCULAR; INTRAVENOUS; SUBCUTANEOUS ONCE
Refills: 0 | Status: COMPLETED | OUTPATIENT
Start: 2021-05-18 | End: 2021-05-18

## 2021-05-18 RX ORDER — SUCRALFATE 1 G
10 TABLET ORAL
Qty: 200 | Refills: 0
Start: 2021-05-18 | End: 2021-05-22

## 2021-05-18 RX ORDER — OMEPRAZOLE 10 MG/1
1 CAPSULE, DELAYED RELEASE ORAL
Qty: 14 | Refills: 0
Start: 2021-05-18 | End: 2021-05-31

## 2021-05-18 RX ORDER — ONDANSETRON 8 MG/1
4 TABLET, FILM COATED ORAL ONCE
Refills: 0 | Status: COMPLETED | OUTPATIENT
Start: 2021-05-18 | End: 2021-05-18

## 2021-05-18 RX ORDER — MORPHINE SULFATE 50 MG/1
4 CAPSULE, EXTENDED RELEASE ORAL ONCE
Refills: 0 | Status: DISCONTINUED | OUTPATIENT
Start: 2021-05-18 | End: 2021-05-18

## 2021-05-18 RX ADMIN — ONDANSETRON 4 MILLIGRAM(S): 8 TABLET, FILM COATED ORAL at 11:35

## 2021-05-18 RX ADMIN — MORPHINE SULFATE 4 MILLIGRAM(S): 50 CAPSULE, EXTENDED RELEASE ORAL at 11:35

## 2021-05-18 RX ADMIN — SODIUM CHLORIDE 1000 MILLILITER(S): 9 INJECTION INTRAMUSCULAR; INTRAVENOUS; SUBCUTANEOUS at 11:32

## 2021-05-18 NOTE — ED STATDOCS - OBJECTIVE STATEMENT
57 y/o female with PMHx of HTN, thyroid c/o abdominal pain. Pt has had pain since yesterday. Pt states pain radiates to back. Pt denies fever, chest pain, urinary symptoms. Pt denies SOB. Pt denies N/V/D. Pt fully vaccinated for Covid.

## 2021-05-18 NOTE — ED STATDOCS - CONSTITUTIONAL, MLM
normal... well appearing, well nourished, and in no apparent distress. Number Of Specimens Per Diagnosis: 1

## 2021-05-18 NOTE — ED STATDOCS - ATTENDING CONTRIBUTION TO CARE
I,Benedicto Bautista MD,  performed the initial face to face bedside interview with this patient regarding history of present illness, review of symptoms and relevant past medical, social and family history.  I completed an independent physical examination.  I was the initial provider who evaluated this patient. I have signed out the follow up of any pending tests (i.e. labs, radiological studies) to the ACP.  I have communicated the patient’s plan of care and disposition with the ACP.  The history, relevant review of systems, past medical and surgical history, medical decision making, and physical examination was documented by the scribe in my presence and I attest to the accuracy of the documentation.

## 2021-05-18 NOTE — ED STATDOCS - NSFOLLOWUPINSTRUCTIONS_ED_ALL_ED_FT
- Follow up with your doctor within 2-3 days.   - Return to the ED for any new or worsening symptoms.   - Take prescribed medications as directed  - Follow-up with GI doctor within 2-4 weeks for further evaluation of symptoms    Abdominal Pain    Many things can cause abdominal pain. Many times, abdominal pain is not caused by a disease and will improve without treatment. Your health care provider will do a physical exam to determine if there is a dangerous cause of your pain; blood tests and imaging may help determine the cause of your pain. However, in many cases, no cause may be found and you may need further testing as an outpatient. Monitor your abdominal pain for any changes.     SEEK IMMEDIATE MEDICAL CARE IF YOU HAVE ANY OF THE FOLLOWING SYMPTOMS: worsening abdominal pain, uncontrollable vomiting, profuse diarrhea, inability to have bowel movements or pass gas, black or bloody stools, fever accompanying chest pain or back pain, or fainting. These symptoms may represent a serious problem that is an emergency. Do not wait to see if the symptoms will go away. Get medical help right away. Call 911 and do not drive yourself to the hospital.

## 2021-05-18 NOTE — ED STATDOCS - CARDIAC, MLM
normal rate, regular rhythm, and no murmur. [Postnasal Drip] : postnasal drip [Nasal Congestion] : nasal congestion [Dyspnea] : dyspnea [Negative] : Sleep Disorder [Cough] : no cough

## 2021-05-18 NOTE — ED STATDOCS - PATIENT PORTAL LINK FT
You can access the FollowMyHealth Patient Portal offered by [facility] by registering at the following website: [email or web address]. By joining [employer]’s FollowMyHealth portal, you will also be able to view your health information using other applications (apps) compatible with our system.

## 2021-05-18 NOTE — ED STATDOCS - PROGRESS NOTE DETAILS
PA note: Sono negative for gallstones, only showing fatty liver. Pt noting improvement in pain, tolerating PO intake. Will send ppi to pharmacy and give GI f/u. copy of results given to pt, results discussed. return precautions discussed, stable for dc. PA note: PT evaluated by intake physician. HPI/PE/ROS as noted above. Will follow up plan per intake physician

## 2021-05-18 NOTE — ED STATDOCS - CARE PROVIDER_API CALL
Romero Quintana)  Gastroenterology; Internal Medicine  [address] [place] [address]  Phone: [phone]  Fax: [phone]  Follow Up Time:

## 2021-05-27 ENCOUNTER — OUTPATIENT (OUTPATIENT)
Dept: OUTPATIENT SERVICES | Facility: HOSPITAL | Age: 66
LOS: 1 days | End: 2021-05-27
Payer: COMMERCIAL

## 2021-05-27 DIAGNOSIS — R06.02 SHORTNESS OF BREATH: ICD-10-CM

## 2021-05-27 PROCEDURE — 93018 CV STRESS TEST I&R ONLY: CPT

## 2021-05-27 PROCEDURE — 93017 CV STRESS TEST TRACING ONLY: CPT

## 2021-05-27 PROCEDURE — 93016 CV STRESS TEST SUPVJ ONLY: CPT

## 2021-09-21 ENCOUNTER — APPOINTMENT (OUTPATIENT)
Dept: UROLOGY | Facility: CLINIC | Age: 66
End: 2021-09-21
Payer: MEDICARE

## 2021-09-21 VITALS
DIASTOLIC BLOOD PRESSURE: 76 MMHG | HEIGHT: 57 IN | BODY MASS INDEX: 44.66 KG/M2 | HEART RATE: 64 BPM | WEIGHT: 207 LBS | TEMPERATURE: 98.2 F | SYSTOLIC BLOOD PRESSURE: 133 MMHG

## 2021-09-21 PROCEDURE — 99203 OFFICE O/P NEW LOW 30 MIN: CPT

## 2021-09-21 NOTE — LETTER BODY
[Dear  ___] : Dear  [unfilled], [Consult Letter:] : I had the pleasure of evaluating your patient, [unfilled]. [( Thank you for referring [unfilled] for consultation for _____ )] : Thank you for referring [unfilled] for consultation for [unfilled] [Please see my note below.] : Please see my note below. [Consult Closing:] : Thank you very much for allowing me to participate in the care of this patient.  If you have any questions, please do not hesitate to contact me. [Sincerely,] : Sincerely, [FreeTextEntry3] : Romero Sanchez MD\par  of Urology\par [facility]\par \par Offices:\par [facility] @\par [address] [place] [address]\par and\par [address], [place] [address], [address]\par and\par [address] [address]\par \par TEL: [phone]\par FAX: [phone]

## 2021-09-21 NOTE — REASON FOR VISIT
[[employer] ] : provided by [employer]   [Initial Visit ___] : [unfilled] is here today for an initial visit  for [unfilled] [Interpreters_IDNumber] : [id number] [Interpreters_FullName] : Olivia

## 2021-09-21 NOTE — ASSESSMENT
[FreeTextEntry1] : Urinary incontinence:\par Encouraged Kegel's exercise. \par Will get Urinalysis and Urine culture. \par Will get Bladder Ultrasound. \par Will consider anti cholinergic or B3 agonist. \par \par Return to office after Ultrasound.

## 2021-09-21 NOTE — REVIEW OF SYSTEMS
[Abdominal Pain] : abdominal pain [Unaware of when urine is leaking] : unaware of when urine is leaking [Negative] : Heme/Lymph [Recent Weight Loss (___ Lbs)] : recent [unfilled] ~Ulb weight loss [Recent Weight Gain (___ Lbs)] : no recent weight gain

## 2021-09-21 NOTE — HISTORY OF PRESENT ILLNESS
[FreeTextEntry1] : 65 yo female presents for urinary incontinence. \par Daytime frequency is 5-6 x or so. Nocturia of 2 x. \par Endorses urgency, urge incontinence. Changes 3 pads during the day and 1 at night. \par Denies dysuria, hematuria, lower abdominal or flank pain, fever, chills or rigors.\par

## 2021-09-22 LAB
APPEARANCE: CLEAR
BACTERIA: NEGATIVE
BILIRUBIN URINE: NEGATIVE
BLOOD URINE: NEGATIVE
COLOR: NORMAL
GLUCOSE QUALITATIVE U: NEGATIVE
HYALINE CASTS: 0 /LPF
KETONES URINE: NEGATIVE
LEUKOCYTE ESTERASE URINE: NEGATIVE
MICROSCOPIC-UA: NORMAL
NITRITE URINE: NEGATIVE
PH URINE: 6.5
PROTEIN URINE: NEGATIVE
RED BLOOD CELLS URINE: 1 /HPF
SPECIFIC GRAVITY URINE: 1.02
SQUAMOUS EPITHELIAL CELLS: 1 /HPF
UROBILINOGEN URINE: NORMAL
WHITE BLOOD CELLS URINE: 1 /HPF

## 2021-09-24 DIAGNOSIS — N39.0 URINARY TRACT INFECTION, SITE NOT SPECIFIED: ICD-10-CM

## 2021-09-27 LAB — BACTERIA UR CULT: ABNORMAL

## 2021-10-15 ENCOUNTER — OUTPATIENT (OUTPATIENT)
Dept: OUTPATIENT SERVICES | Facility: HOSPITAL | Age: 66
LOS: 1 days | End: 2021-10-15

## 2021-10-15 ENCOUNTER — APPOINTMENT (OUTPATIENT)
Dept: ULTRASOUND IMAGING | Facility: CLINIC | Age: 66
End: 2021-10-15
Payer: MEDICARE

## 2021-10-15 DIAGNOSIS — R32 UNSPECIFIED URINARY INCONTINENCE: ICD-10-CM

## 2021-10-15 PROCEDURE — 76857 US EXAM PELVIC LIMITED: CPT | Mod: 26

## 2021-11-16 ENCOUNTER — APPOINTMENT (OUTPATIENT)
Dept: UROLOGY | Facility: CLINIC | Age: 66
End: 2021-11-16
Payer: MEDICARE

## 2021-11-16 VITALS — TEMPERATURE: 98.1 F

## 2021-11-16 DIAGNOSIS — R32 UNSPECIFIED URINARY INCONTINENCE: ICD-10-CM

## 2021-11-16 PROCEDURE — 99213 OFFICE O/P EST LOW 20 MIN: CPT

## 2021-11-27 NOTE — LETTER BODY
[Dear  ___] : Dear  [unfilled], [Courtesy Letter:] : I had the pleasure of seeing your patient, [unfilled], in my office today. [Please see my note below.] : Please see my note below. [Sincerely,] : Sincerely, [FreeTextEntry3] : Romero Sanchez MD\par  of Urology\par [facility]\par \par Offices:\par [facility] @\par [address], [place] [address]\par and\par [address], [place] [address], [address]\par and\par [address] [address]\par \par TEL: [phone]\par FAX: [phone]

## 2021-11-27 NOTE — REASON FOR VISIT
[[employer] ] : provided by [employer]   [Source: ______] : History obtained from [unfilled] [Follow-up Visit ___] : a follow-up visit  for [unfilled] [Interpreters_IDNumber] : [id number] [Interpreters_FullName] : Conchita

## 2021-11-27 NOTE — HISTORY OF PRESENT ILLNESS
[FreeTextEntry1] : 67 yo female presents for follow up \par After last visit Urine culture was positive, treated with Antibiotics. \par Denies dysuria, hematuria, lower abdominal or flank pain, nausea, vomiting, fever, chills or rigors. \par Had Bladder Ultrasound. \par \par Initially seen for urinary incontinence. \par Daytime frequency is 5-6 x or so. Nocturia of 2 x. \par Endorsed urgency, urge incontinence. Changes 3 pads during the day and 1 at night. \par \par

## 2022-05-19 ENCOUNTER — APPOINTMENT (OUTPATIENT)
Dept: BREAST CENTER | Facility: CLINIC | Age: 67
End: 2022-05-19

## 2022-09-30 ENCOUNTER — EMERGENCY (EMERGENCY)
Facility: HOSPITAL | Age: 67
LOS: 1 days | Discharge: HOME | End: 2022-09-30
Attending: STUDENT IN AN ORGANIZED HEALTH CARE EDUCATION/TRAINING PROGRAM
Payer: MEDICARE

## 2022-09-30 VITALS
TEMPERATURE: 98 F | RESPIRATION RATE: 19 BRPM | SYSTOLIC BLOOD PRESSURE: 125 MMHG | HEART RATE: 70 BPM | DIASTOLIC BLOOD PRESSURE: 60 MMHG | OXYGEN SATURATION: 99 %

## 2022-09-30 VITALS
WEIGHT: 293 LBS | RESPIRATION RATE: 18 BRPM | SYSTOLIC BLOOD PRESSURE: 126 MMHG | HEART RATE: 67 BPM | HEIGHT: 57 IN | DIASTOLIC BLOOD PRESSURE: 62 MMHG | OXYGEN SATURATION: 98 % | TEMPERATURE: 98 F

## 2022-09-30 LAB
ALBUMIN SERPL ELPH-MCNC: 4.4 G/DL — SIGNIFICANT CHANGE UP (ref 3.3–5.2)
ALP SERPL-CCNC: 75 U/L — SIGNIFICANT CHANGE UP (ref 40–120)
ALT FLD-CCNC: 35 U/L — HIGH
ANION GAP SERPL CALC-SCNC: 11 MMOL/L — SIGNIFICANT CHANGE UP (ref 5–17)
AST SERPL-CCNC: 29 U/L — SIGNIFICANT CHANGE UP
BASOPHILS # BLD AUTO: 0.07 K/UL — SIGNIFICANT CHANGE UP (ref 0–0.2)
BASOPHILS NFR BLD AUTO: 0.7 % — SIGNIFICANT CHANGE UP (ref 0–2)
BILIRUB SERPL-MCNC: 0.4 MG/DL — SIGNIFICANT CHANGE UP (ref 0.4–2)
BUN SERPL-MCNC: 11.7 MG/DL — SIGNIFICANT CHANGE UP (ref 8–20)
CALCIUM SERPL-MCNC: 9.7 MG/DL — SIGNIFICANT CHANGE UP (ref 8.4–10.5)
CHLORIDE SERPL-SCNC: 102 MMOL/L — SIGNIFICANT CHANGE UP (ref 98–107)
CO2 SERPL-SCNC: 28 MMOL/L — SIGNIFICANT CHANGE UP (ref 22–29)
CREAT SERPL-MCNC: 0.69 MG/DL — SIGNIFICANT CHANGE UP (ref 0.5–1.3)
EGFR: 94 ML/MIN/1.73M2 — SIGNIFICANT CHANGE UP
EOSINOPHIL # BLD AUTO: 0.2 K/UL — SIGNIFICANT CHANGE UP (ref 0–0.5)
EOSINOPHIL NFR BLD AUTO: 2 % — SIGNIFICANT CHANGE UP (ref 0–6)
GLUCOSE SERPL-MCNC: 84 MG/DL — SIGNIFICANT CHANGE UP (ref 70–99)
HCT VFR BLD CALC: 39.9 % — SIGNIFICANT CHANGE UP (ref 34.5–45)
HGB BLD-MCNC: 13.1 G/DL — SIGNIFICANT CHANGE UP (ref 11.5–15.5)
IMM GRANULOCYTES NFR BLD AUTO: 0.4 % — SIGNIFICANT CHANGE UP (ref 0–0.9)
LIDOCAIN IGE QN: 27 U/L — SIGNIFICANT CHANGE UP (ref 22–51)
LYMPHOCYTES # BLD AUTO: 2.63 K/UL — SIGNIFICANT CHANGE UP (ref 1–3.3)
LYMPHOCYTES # BLD AUTO: 26.6 % — SIGNIFICANT CHANGE UP (ref 13–44)
MCHC RBC-ENTMCNC: 28.7 PG — SIGNIFICANT CHANGE UP (ref 27–34)
MCHC RBC-ENTMCNC: 32.8 GM/DL — SIGNIFICANT CHANGE UP (ref 32–36)
MCV RBC AUTO: 87.3 FL — SIGNIFICANT CHANGE UP (ref 80–100)
MONOCYTES # BLD AUTO: 1.1 K/UL — HIGH (ref 0–0.9)
MONOCYTES NFR BLD AUTO: 11.1 % — SIGNIFICANT CHANGE UP (ref 2–14)
NEUTROPHILS # BLD AUTO: 5.86 K/UL — SIGNIFICANT CHANGE UP (ref 1.8–7.4)
NEUTROPHILS NFR BLD AUTO: 59.2 % — SIGNIFICANT CHANGE UP (ref 43–77)
PLATELET # BLD AUTO: 234 K/UL — SIGNIFICANT CHANGE UP (ref 150–400)
POTASSIUM SERPL-MCNC: 4.3 MMOL/L — SIGNIFICANT CHANGE UP (ref 3.5–5.3)
POTASSIUM SERPL-SCNC: 4.3 MMOL/L — SIGNIFICANT CHANGE UP (ref 3.5–5.3)
PROT SERPL-MCNC: 7.7 G/DL — SIGNIFICANT CHANGE UP (ref 6.6–8.7)
RBC # BLD: 4.57 M/UL — SIGNIFICANT CHANGE UP (ref 3.8–5.2)
RBC # FLD: 13.3 % — SIGNIFICANT CHANGE UP (ref 10.3–14.5)
SODIUM SERPL-SCNC: 141 MMOL/L — SIGNIFICANT CHANGE UP (ref 135–145)
WBC # BLD: 9.9 K/UL — SIGNIFICANT CHANGE UP (ref 3.8–10.5)
WBC # FLD AUTO: 9.9 K/UL — SIGNIFICANT CHANGE UP (ref 3.8–10.5)

## 2022-09-30 PROCEDURE — 80053 COMPREHEN METABOLIC PANEL: CPT

## 2022-09-30 PROCEDURE — 96374 THER/PROPH/DIAG INJ IV PUSH: CPT | Mod: XU

## 2022-09-30 PROCEDURE — 74177 CT ABD & PELVIS W/CONTRAST: CPT | Mod: MD

## 2022-09-30 PROCEDURE — 36415 COLL VENOUS BLD VENIPUNCTURE: CPT

## 2022-09-30 PROCEDURE — 85025 COMPLETE CBC W/AUTO DIFF WBC: CPT

## 2022-09-30 PROCEDURE — 83690 ASSAY OF LIPASE: CPT

## 2022-09-30 PROCEDURE — 74177 CT ABD & PELVIS W/CONTRAST: CPT | Mod: 26,MD

## 2022-09-30 PROCEDURE — 99284 EMERGENCY DEPT VISIT MOD MDM: CPT | Mod: 25

## 2022-09-30 PROCEDURE — 99284 EMERGENCY DEPT VISIT MOD MDM: CPT

## 2022-09-30 RX ORDER — ACETAMINOPHEN 500 MG
650 TABLET ORAL ONCE
Refills: 0 | Status: COMPLETED | OUTPATIENT
Start: 2022-09-30 | End: 2022-09-30

## 2022-09-30 RX ORDER — FAMOTIDINE 10 MG/ML
20 INJECTION INTRAVENOUS ONCE
Refills: 0 | Status: COMPLETED | OUTPATIENT
Start: 2022-09-30 | End: 2022-09-30

## 2022-09-30 RX ORDER — OXYCODONE AND ACETAMINOPHEN 5; 325 MG/1; MG/1
1 TABLET ORAL
Qty: 9 | Refills: 0
Start: 2022-09-30 | End: 2022-10-02

## 2022-09-30 RX ORDER — METRONIDAZOLE 500 MG
1 TABLET ORAL
Qty: 42 | Refills: 0
Start: 2022-09-30 | End: 2022-10-13

## 2022-09-30 RX ADMIN — FAMOTIDINE 20 MILLIGRAM(S): 10 INJECTION INTRAVENOUS at 13:48

## 2022-09-30 RX ADMIN — Medication 650 MILLIGRAM(S): at 13:48

## 2022-09-30 RX ADMIN — Medication 1 TABLET(S): at 17:23

## 2022-09-30 NOTE — ED STATDOCS - NSFOLLOWUPINSTRUCTIONS_ED_ALL_ED_FT
Diverticulitis    Diverticulitis is inflammation or infection of small pouches in your colon that form when you HAVE a condition called diverticulosis. This condition can range from mild to severe potentially leading to perforation or obstructions of your colon. Symptoms include abdominal pain, fever/chills, nausea, vomiting, diarrhea, constipation, or blood in your stool. If you were prescribed an antibiotic medicine, take it as told by your health care provider. Do not stop taking the antibiotic even if you start to feel better.    SEEK IMMEDIATE MEDICAL CARE IF YOU HAVE ANY OF THE FOLLOWING SYMPTOMS: worsening abdominal pain, high fever, inability to hold down liquids or medication, black or bloody stools, inability to pass gas, lightheadedness/dizziness, or a change in mental status.     Please follow up with your doctor within 48 hours  Please follow up with gastroenterology within 4 days

## 2022-09-30 NOTE — ED STATDOCS - PROGRESS NOTE DETAILS
Results discussed with PT. Pain is well controlled. Precautions discussed. Will d/c with outpatient follow up

## 2022-09-30 NOTE — ED ADULT TRIAGE NOTE - HEIGHT IN CM
SOCIAL WORK/DISCHARGE PLANNING;  Spoke with [employer], Eric Meyer. They are still waiting insurance pre-cert and she will call as soon as they receive decision. Spoke with pt and she is aware. Answered her questions re rubi Whatley W [phone]

## 2022-09-30 NOTE — ED ADULT NURSE NOTE - OBJECTIVE STATEMENT
Patient is alert and oriented X4 from home. Patient is complains of abdominal pain that is a 7/10 at this time and has gotten worse over the past few days

## 2022-09-30 NOTE — ED STATDOCS - NS ED ATTENDING STATEMENT MOD
This was a shared visit with the RODERICK. I reviewed and verified the documentation and independently performed the documented:

## 2022-09-30 NOTE — ED STATDOCS - CLINICAL SUMMARY MEDICAL DECISION MAKING FREE TEXT BOX
Pt is well appearing, tenderness at LLQ. Will check CT to evaluate for diverticulitis, labs and reassess.

## 2022-09-30 NOTE — ED STATDOCS - ATTENDING APP SHARED VISIT CONTRIBUTION OF CARE
I, Alexa Dennis, performed a face to face bedside interview with this patient regarding history of present illness, review of symptoms and relevant past medical, social and family history.  I completed an independent physical examination. Medical decision making, follow-up on ordered tests (ie labs, radiologic studies) and re-evaluation of the patient's status has been communicated to the ACP.  Disposition of the patient will be based on test outcome and response to ED interventions.

## 2022-09-30 NOTE — ED STATDOCS - PHYSICAL EXAMINATION
Vital Signs per nursing documentation  Gen: well appearing, no acute distress  HEENT: NCAT, MMM  Cardiac: regular rate rhythm, normal S1S2  Chest: clear to auscultation bilateral, no wheezes or crackles  Abdomen: soft, Tenderness @ LLQ  non distended  Extremity: no gross deformity, good perfusion  Skin: no rash  Neuro: nonfocal neuro exam, gait steady

## 2022-09-30 NOTE — ED STATDOCS - OBJECTIVE STATEMENT
66 y/o female with pmhx HTN, Pre-DM, High cholesterol and Hypothyroid, p/w LLQ abdominal pain, worsening since last night. States having pain on and off for the past 2 month. Pt was seen at  with neg UA and sent to ED. Denies fever, n/v, chest pain, SOB, back pain, or diarrhea. Denies any abdominal surgeries in the past. No treatment PTA. Despite triage note, pt denies any leg pain.

## 2022-09-30 NOTE — ED STATDOCS - PATIENT PORTAL LINK FT
You can access the FollowMyHealth Patient Portal offered by [facility] by registering at the following website: [email or web address]. By joining [employer]’s FollowMyHealth portal, you will also be able to view your health information using other applications (apps) compatible with our system.

## 2022-10-02 ENCOUNTER — EMERGENCY (EMERGENCY)
Facility: HOSPITAL | Age: 67
LOS: 1 days | Discharge: HOME | End: 2022-10-02
Attending: EMERGENCY MEDICINE
Payer: MEDICARE

## 2022-10-02 VITALS
HEART RATE: 70 BPM | TEMPERATURE: 99 F | HEIGHT: 57 IN | SYSTOLIC BLOOD PRESSURE: 112 MMHG | DIASTOLIC BLOOD PRESSURE: 50 MMHG | WEIGHT: 250 LBS | OXYGEN SATURATION: 95 % | RESPIRATION RATE: 18 BRPM

## 2022-10-02 VITALS
RESPIRATION RATE: 17 BRPM | HEART RATE: 64 BPM | OXYGEN SATURATION: 98 % | SYSTOLIC BLOOD PRESSURE: 154 MMHG | DIASTOLIC BLOOD PRESSURE: 71 MMHG

## 2022-10-02 LAB
ALBUMIN SERPL ELPH-MCNC: 4.1 G/DL — SIGNIFICANT CHANGE UP (ref 3.3–5.2)
ALP SERPL-CCNC: 71 U/L — SIGNIFICANT CHANGE UP (ref 40–120)
ALT FLD-CCNC: 33 U/L — HIGH
ANION GAP SERPL CALC-SCNC: 11 MMOL/L — SIGNIFICANT CHANGE UP (ref 5–17)
APPEARANCE UR: CLEAR — SIGNIFICANT CHANGE UP
AST SERPL-CCNC: 38 U/L — HIGH
BACTERIA # UR AUTO: ABNORMAL
BASOPHILS # BLD AUTO: 0.06 K/UL — SIGNIFICANT CHANGE UP (ref 0–0.2)
BASOPHILS NFR BLD AUTO: 0.6 % — SIGNIFICANT CHANGE UP (ref 0–2)
BILIRUB SERPL-MCNC: 0.5 MG/DL — SIGNIFICANT CHANGE UP (ref 0.4–2)
BILIRUB UR-MCNC: NEGATIVE — SIGNIFICANT CHANGE UP
BUN SERPL-MCNC: 7.5 MG/DL — LOW (ref 8–20)
CALCIUM SERPL-MCNC: 9.2 MG/DL — SIGNIFICANT CHANGE UP (ref 8.4–10.5)
CHLORIDE SERPL-SCNC: 99 MMOL/L — SIGNIFICANT CHANGE UP (ref 98–107)
CO2 SERPL-SCNC: 27 MMOL/L — SIGNIFICANT CHANGE UP (ref 22–29)
COLOR SPEC: YELLOW — SIGNIFICANT CHANGE UP
CREAT SERPL-MCNC: 0.72 MG/DL — SIGNIFICANT CHANGE UP (ref 0.5–1.3)
DIFF PNL FLD: NEGATIVE — SIGNIFICANT CHANGE UP
EGFR: 91 ML/MIN/1.73M2 — SIGNIFICANT CHANGE UP
EOSINOPHIL # BLD AUTO: 0.17 K/UL — SIGNIFICANT CHANGE UP (ref 0–0.5)
EOSINOPHIL NFR BLD AUTO: 1.7 % — SIGNIFICANT CHANGE UP (ref 0–6)
EPI CELLS # UR: SIGNIFICANT CHANGE UP
GLUCOSE SERPL-MCNC: 85 MG/DL — SIGNIFICANT CHANGE UP (ref 70–99)
GLUCOSE UR QL: NEGATIVE MG/DL — SIGNIFICANT CHANGE UP
HCT VFR BLD CALC: 39.7 % — SIGNIFICANT CHANGE UP (ref 34.5–45)
HGB BLD-MCNC: 13.1 G/DL — SIGNIFICANT CHANGE UP (ref 11.5–15.5)
HIV 1 & 2 AB SERPL IA.RAPID: SIGNIFICANT CHANGE UP
IMM GRANULOCYTES NFR BLD AUTO: 0.5 % — SIGNIFICANT CHANGE UP (ref 0–0.9)
KETONES UR-MCNC: NEGATIVE — SIGNIFICANT CHANGE UP
LACTATE BLDV-MCNC: 0.8 MMOL/L — SIGNIFICANT CHANGE UP (ref 0.5–2)
LEUKOCYTE ESTERASE UR-ACNC: ABNORMAL
LIDOCAIN IGE QN: 19 U/L — LOW (ref 22–51)
LYMPHOCYTES # BLD AUTO: 2.63 K/UL — SIGNIFICANT CHANGE UP (ref 1–3.3)
LYMPHOCYTES # BLD AUTO: 26.1 % — SIGNIFICANT CHANGE UP (ref 13–44)
MCHC RBC-ENTMCNC: 28.7 PG — SIGNIFICANT CHANGE UP (ref 27–34)
MCHC RBC-ENTMCNC: 33 GM/DL — SIGNIFICANT CHANGE UP (ref 32–36)
MCV RBC AUTO: 87.1 FL — SIGNIFICANT CHANGE UP (ref 80–100)
MONOCYTES # BLD AUTO: 1.18 K/UL — HIGH (ref 0–0.9)
MONOCYTES NFR BLD AUTO: 11.7 % — SIGNIFICANT CHANGE UP (ref 2–14)
NEUTROPHILS # BLD AUTO: 6 K/UL — SIGNIFICANT CHANGE UP (ref 1.8–7.4)
NEUTROPHILS NFR BLD AUTO: 59.4 % — SIGNIFICANT CHANGE UP (ref 43–77)
NITRITE UR-MCNC: NEGATIVE — SIGNIFICANT CHANGE UP
PH UR: 8 — SIGNIFICANT CHANGE UP (ref 5–8)
PLATELET # BLD AUTO: 238 K/UL — SIGNIFICANT CHANGE UP (ref 150–400)
POTASSIUM SERPL-MCNC: 4.5 MMOL/L — SIGNIFICANT CHANGE UP (ref 3.5–5.3)
POTASSIUM SERPL-SCNC: 4.5 MMOL/L — SIGNIFICANT CHANGE UP (ref 3.5–5.3)
PROT SERPL-MCNC: 7.8 G/DL — SIGNIFICANT CHANGE UP (ref 6.6–8.7)
PROT UR-MCNC: NEGATIVE — SIGNIFICANT CHANGE UP
RBC # BLD: 4.56 M/UL — SIGNIFICANT CHANGE UP (ref 3.8–5.2)
RBC # FLD: 13.1 % — SIGNIFICANT CHANGE UP (ref 10.3–14.5)
RBC CASTS # UR COMP ASSIST: SIGNIFICANT CHANGE UP /HPF (ref 0–4)
SODIUM SERPL-SCNC: 137 MMOL/L — SIGNIFICANT CHANGE UP (ref 135–145)
SP GR SPEC: 1.01 — SIGNIFICANT CHANGE UP (ref 1.01–1.02)
UROBILINOGEN FLD QL: NEGATIVE MG/DL — SIGNIFICANT CHANGE UP
WBC # BLD: 10.09 K/UL — SIGNIFICANT CHANGE UP (ref 3.8–10.5)
WBC # FLD AUTO: 10.09 K/UL — SIGNIFICANT CHANGE UP (ref 3.8–10.5)
WBC UR QL: SIGNIFICANT CHANGE UP /HPF (ref 0–5)

## 2022-10-02 PROCEDURE — 99285 EMERGENCY DEPT VISIT HI MDM: CPT

## 2022-10-02 RX ORDER — SODIUM CHLORIDE 9 MG/ML
1000 INJECTION INTRAMUSCULAR; INTRAVENOUS; SUBCUTANEOUS ONCE
Refills: 0 | Status: COMPLETED | OUTPATIENT
Start: 2022-10-02 | End: 2022-10-02

## 2022-10-02 RX ORDER — MORPHINE SULFATE 50 MG/1
4 CAPSULE, EXTENDED RELEASE ORAL ONCE
Refills: 0 | Status: DISCONTINUED | OUTPATIENT
Start: 2022-10-02 | End: 2022-10-02

## 2022-10-02 RX ORDER — ONDANSETRON 8 MG/1
4 TABLET, FILM COATED ORAL ONCE
Refills: 0 | Status: COMPLETED | OUTPATIENT
Start: 2022-10-02 | End: 2022-10-02

## 2022-10-02 RX ORDER — CIPROFLOXACIN LACTATE 400MG/40ML
400 VIAL (ML) INTRAVENOUS ONCE
Refills: 0 | Status: COMPLETED | OUTPATIENT
Start: 2022-10-02 | End: 2022-10-02

## 2022-10-02 RX ORDER — METRONIDAZOLE 500 MG
500 TABLET ORAL ONCE
Refills: 0 | Status: COMPLETED | OUTPATIENT
Start: 2022-10-02 | End: 2022-10-02

## 2022-10-02 RX ADMIN — MORPHINE SULFATE 4 MILLIGRAM(S): 50 CAPSULE, EXTENDED RELEASE ORAL at 20:29

## 2022-10-02 RX ADMIN — ONDANSETRON 4 MILLIGRAM(S): 8 TABLET, FILM COATED ORAL at 20:33

## 2022-10-02 RX ADMIN — SODIUM CHLORIDE 1000 MILLILITER(S): 9 INJECTION INTRAMUSCULAR; INTRAVENOUS; SUBCUTANEOUS at 17:46

## 2022-10-02 RX ADMIN — Medication 100 MILLIGRAM(S): at 20:36

## 2022-10-02 RX ADMIN — Medication 200 MILLIGRAM(S): at 17:47

## 2022-10-02 NOTE — ED ADULT TRIAGE NOTE - CHIEF COMPLAINT QUOTE
pt c/o left side low abdominal pain, started a few months ago, pain is worse  A&Ox3, resp wnl, was seen here recently

## 2022-10-02 NOTE — ED STATDOCS - PATIENT PORTAL LINK FT
You can access the FollowMyHealth Patient Portal offered by [facility] by registering at the following website: [email or web address]. By joining [employer]’s FollowMyHealth portal, you will also be able to view your health information using other applications (apps) compatible with our system.

## 2022-10-02 NOTE — ED ADULT NURSE REASSESSMENT NOTE - NS ED NURSE REASSESS COMMENT FT1
pt resting in stretcher, awaiting CT scan. respirations even and unlabored. pt shows no s/s of acute distress at this time. safety precautions maintained.

## 2022-10-02 NOTE — ED ADULT NURSE REASSESSMENT NOTE - NS ED NURSE REASSESS COMMENT FT1
assumed care for pt 1930. pt states she is in 9/10 abd pain. no other complaints at this time. respirations even and unlabored. pt shows no s/s of acute distress at this time. safety precautions maintained.

## 2022-10-02 NOTE — ED STATDOCS - EYES, MLM
clear bilaterally.  Pupils equal, round, and reactive to light. No scleral icterus. No scleral icterus.

## 2022-10-02 NOTE — ED STATDOCS - CLINICAL SUMMARY MEDICAL DECISION MAKING FREE TEXT BOX
Recent diagnosis of uncomplicated sigmoid diverticulitis on Friday with worsening pain, repeat labs and CAT scan. Recent diagnosis of uncomplicated sigmoid diverticulitis on Friday with worsening pain despite oral antibiotics: repeat labs and CAT scan.  IV cipro and flagyl.

## 2022-10-02 NOTE — ED STATDOCS - OBJECTIVE STATEMENT
68 y/o female with PMHx of hyperthyroid, HTN, pre-DM presents after being seen here Friday, states the medicine for pain is not working. Pt states when she walks, she has LLQ pain. Pain started a few months ago, but exacerbated Friday morning, prompting prior visit. Pain is constant and does not radiate. Denies n/v, chills, fever. Last BM this morning, normal. Pt reports 2 years ago she had diverticulitis diagnosis, has not seen GI specialist. Last colonoscopy was 1 year ago, results normal. No allergies to meds, pt is non-smoker. On Augmentin 3x a day for sigmoid diverticulitis dx on Friday. Intermittent pain in LLQ for the past month. reports strong pain since friday morning:  LLQ, no radiation without n/v or fever.  Seen in ED on friday and diagnosed with mild uncomplicated acute sigmoid diverticulitis.  Taking augmentin three times a day and percocet but reports ongoing discomfort.  Reports difficulty standing up straight.  Denies fever or nausea at present.  H/o prior diverticulitis.  Last colonoscopy 1 year ago and normal. Intermittent pain in LLQ for the past month. reports strong pain since Friday morning:  LLQ, no radiation without n/v or fever.  Seen in ED on Friday and diagnosed with mild uncomplicated acute sigmoid diverticulitis.  Taking Augmentin three times a day and percocet but reports ongoing discomfort.  Reports difficulty standing up straight.  Denies fever or nausea at present.  H/o prior diverticulitis.  Last colonoscopy 1 year ago and normal.

## 2022-10-02 NOTE — ED ADULT TRIAGE NOTE - WEIGHT IN LBS
250 Non-Graft Cartilage Fenestration Text: The cartilage was fenestrated with a 2mm punch biopsy to help facilitate healing.

## 2022-10-02 NOTE — ED STATDOCS - PROGRESS NOTE DETAILS
Patient receiving IV hydration and antibiotics. She reports of continued pain. No worsening of condition. Pending CT scan. Will continue to follow. history and physical performed by intake physician - results reviewed and case discussed with attending

## 2022-10-02 NOTE — ED STATDOCS - NS_ ATTENDINGSCRIBEDETAILS _ED_A_ED_FT
I, Lizandro Pickard, performed the initial face to face bedside interview with this patient regarding history of present illness, review of symptoms and relevant past medical, social and family history.  I completed an independent physical examination.  I was the provider who initially evaluated this patient.  The history, relevant review of systems, past medical and surgical history, medical decision making, and physical examination was documented by the scribe in my presence and I attest to the accuracy of the documentation. Follow-up on ordered tests (ie labs, radiologic studies) and re-evaluation of the patient's status has been communicated to the ACP.  Disposition of the patient will be based on test outcome and response to ED interventions.

## 2022-10-03 PROCEDURE — 80053 COMPREHEN METABOLIC PANEL: CPT

## 2022-10-03 PROCEDURE — 81001 URINALYSIS AUTO W/SCOPE: CPT

## 2022-10-03 PROCEDURE — 85025 COMPLETE CBC W/AUTO DIFF WBC: CPT

## 2022-10-03 PROCEDURE — 83690 ASSAY OF LIPASE: CPT

## 2022-10-03 PROCEDURE — G1004: CPT

## 2022-10-03 PROCEDURE — 74177 CT ABD & PELVIS W/CONTRAST: CPT | Mod: ME

## 2022-10-03 PROCEDURE — 96374 THER/PROPH/DIAG INJ IV PUSH: CPT | Mod: XU

## 2022-10-03 PROCEDURE — 74177 CT ABD & PELVIS W/CONTRAST: CPT | Mod: 26,ME

## 2022-10-03 PROCEDURE — 96375 TX/PRO/DX INJ NEW DRUG ADDON: CPT

## 2022-10-03 PROCEDURE — 83605 ASSAY OF LACTIC ACID: CPT

## 2022-10-03 PROCEDURE — 36415 COLL VENOUS BLD VENIPUNCTURE: CPT

## 2022-10-03 PROCEDURE — 86703 HIV-1/HIV-2 1 RESULT ANTBDY: CPT

## 2022-10-03 PROCEDURE — 99284 EMERGENCY DEPT VISIT MOD MDM: CPT | Mod: 25

## 2022-11-22 ENCOUNTER — OFFICE (OUTPATIENT)
Dept: URBAN - METROPOLITAN AREA CLINIC 94 | Facility: CLINIC | Age: 67
Setting detail: OPHTHALMOLOGY
End: 2022-11-22
Payer: MEDICAID

## 2022-11-22 DIAGNOSIS — H35.373: ICD-10-CM

## 2022-11-22 DIAGNOSIS — H33.312: ICD-10-CM

## 2022-11-22 DIAGNOSIS — H35.033: ICD-10-CM

## 2022-11-22 DIAGNOSIS — H43.812: ICD-10-CM

## 2022-11-22 PROBLEM — H40.052 OCULAR HYPERTENSION; LEFT EYE: Status: ACTIVE | Noted: 2022-11-22

## 2022-11-22 PROCEDURE — 92235 FLUORESCEIN ANGRPH MLTIFRAME: CPT | Performed by: OPHTHALMOLOGY

## 2022-11-22 PROCEDURE — 92134 CPTRZ OPH DX IMG PST SGM RTA: CPT | Performed by: OPHTHALMOLOGY

## 2022-11-22 PROCEDURE — 92012 INTRM OPH EXAM EST PATIENT: CPT | Performed by: OPHTHALMOLOGY

## 2022-11-22 ASSESSMENT — PACHYMETRY
OS_CT_UM: 523
OD_CT_UM: 516
OS_CT_CORRECTION: 1
OD_CT_CORRECTION: 2

## 2022-11-22 ASSESSMENT — SUPERFICIAL PUNCTATE KERATITIS (SPK)
OD_SPK: 1+
OS_SPK: 1+

## 2022-11-22 ASSESSMENT — REFRACTION_AUTOREFRACTION
OS_AXIS: 092
OS_SPHERE: +1.00
OS_CYLINDER: -1.50
OD_SPHERE: -1.00
OD_AXIS: 108
OD_CYLINDER: -3.25

## 2022-11-22 ASSESSMENT — KERATOMETRY
OD_AXISANGLE_DEGREES: 018
OS_K2POWER_DIOPTERS: 43.50
OS_K1POWER_DIOPTERS: 42.50
OD_K1POWER_DIOPTERS: 42.00
OS_AXISANGLE_DEGREES: 177
METHOD_AUTO_MANUAL: AUTO
OD_K2POWER_DIOPTERS: 45.00

## 2022-11-22 ASSESSMENT — VISUAL ACUITY
OD_BCVA: 20/20
OS_BCVA: 20/30

## 2022-11-22 ASSESSMENT — CONFRONTATIONAL VISUAL FIELD TEST (CVF)
OS_FINDINGS: FULL
OD_FINDINGS: FULL

## 2022-11-22 ASSESSMENT — AXIALLENGTH_DERIVED
OS_AL: 23.6785
OD_AL: 24.661

## 2022-11-22 ASSESSMENT — LID EXAM ASSESSMENTS
OD_BLEPHARITIS: RLL 1+
OS_BLEPHARITIS: LLL 1+

## 2022-11-22 ASSESSMENT — SPHEQUIV_DERIVED
OD_SPHEQUIV: -2.625
OS_SPHEQUIV: 0.25

## 2022-11-22 ASSESSMENT — TEAR BREAK UP TIME (TBUT)
OD_TBUT: T
OS_TBUT: T

## 2022-11-22 ASSESSMENT — TONOMETRY
OD_IOP_MMHG: 17
OS_IOP_MMHG: 18

## 2022-11-25 ENCOUNTER — APPOINTMENT (OUTPATIENT)
Dept: BREAST CENTER | Facility: CLINIC | Age: 67
End: 2022-11-25

## 2022-11-25 VITALS
SYSTOLIC BLOOD PRESSURE: 145 MMHG | BODY MASS INDEX: 35.76 KG/M2 | HEIGHT: 60.63 IN | WEIGHT: 187 LBS | DIASTOLIC BLOOD PRESSURE: 85 MMHG

## 2023-01-10 NOTE — ED ADULT NURSE NOTE - CCCP TRG CHIEF CMPLNT
Subjective:       Patient ID: Katelyn Andersen Neo Mancia MD;  Location: [facility] ENDO (4TH FLR);  Service: Endoscopy;  Laterality: N/A;    COLONOSCOPY N/A 12/14/2021    Procedure: COLONOSCOPY;  Surgeon: Neo Mancia MD;  Location: [facility] ENDO (4TH FLR);  Dosc Diagnostic Provider;  Location: [facility] EP LAB;  [facility] [place], [place]

## 2023-01-25 NOTE — ED STATDOCS - DIAGNOSIS COUNSELING, MDM
conducted a detailed discussion... Libtayo Pregnancy And Lactation Text: This medication is contraindicated in pregnancy and when breast feeding.

## 2023-02-08 NOTE — ED ADULT TRIAGE NOTE - NSWEIGHTCALCTOOLDRUG_GEN_A_CORE
Discharge instructions given with family at the bedside.  Pt and family verbalized understanding of discharge instructions  used

## 2023-02-17 ENCOUNTER — OFFICE (OUTPATIENT)
Dept: URBAN - METROPOLITAN AREA CLINIC 94 | Facility: CLINIC | Age: 68
Setting detail: OPHTHALMOLOGY
End: 2023-02-17
Payer: COMMERCIAL

## 2023-02-17 DIAGNOSIS — H01.005: ICD-10-CM

## 2023-02-17 DIAGNOSIS — H01.002: ICD-10-CM

## 2023-02-17 DIAGNOSIS — H40.31X2: ICD-10-CM

## 2023-02-17 DIAGNOSIS — H16.223: ICD-10-CM

## 2023-02-17 PROBLEM — H40.052 OCULAR HYPERTENSION; LEFT EYE: Status: ACTIVE | Noted: 2023-02-17

## 2023-02-17 PROCEDURE — 99213 OFFICE O/P EST LOW 20 MIN: CPT | Performed by: OPHTHALMOLOGY

## 2023-02-17 PROCEDURE — 92083 EXTENDED VISUAL FIELD XM: CPT | Performed by: OPHTHALMOLOGY

## 2023-02-17 PROCEDURE — 92133 CPTRZD OPH DX IMG PST SGM ON: CPT | Performed by: OPHTHALMOLOGY

## 2023-02-17 PROCEDURE — 83861 MICROFLUID ANALY TEARS: CPT | Performed by: OPHTHALMOLOGY

## 2023-02-17 ASSESSMENT — REFRACTION_CURRENTRX
OD_VPRISM_DIRECTION: BF
OS_OVR_VA: 20/
OD_CYLINDER: -2.25
OD_ADD: +2.50
OS_AXIS: 079
OS_ADD: +3.00
OS_SPHERE: +0.50
OS_OVR_VA: 20/
OD_AXIS: 102
OS_VPRISM_DIRECTION: BF
OS_CYLINDER: -1.25
OD_OVR_VA: 20/
OD_SPHERE: -1.50
OD_OVR_VA: 20/

## 2023-02-17 ASSESSMENT — PACHYMETRY
OS_CT_UM: 523
OS_CT_CORRECTION: 1
OD_CT_UM: 516
OD_CT_CORRECTION: 2

## 2023-02-17 ASSESSMENT — TEAR BREAK UP TIME (TBUT)
OS_TBUT: T
OD_TBUT: T

## 2023-02-17 ASSESSMENT — CONFRONTATIONAL VISUAL FIELD TEST (CVF)
OS_FINDINGS: FULL
OD_FINDINGS: FULL

## 2023-02-17 ASSESSMENT — TONOMETRY
OD_IOP_MMHG: 15
OS_IOP_MMHG: 18
OS_IOP_MMHG: 18
OD_IOP_MMHG: 16

## 2023-02-17 ASSESSMENT — AXIALLENGTH_DERIVED
OD_AL: 24.7143
OS_AL: 23.6841

## 2023-02-17 ASSESSMENT — VISUAL ACUITY
OD_BCVA: 20/20
OS_BCVA: 20/25

## 2023-02-17 ASSESSMENT — KERATOMETRY
METHOD_AUTO_MANUAL: AUTO
OD_K2POWER_DIOPTERS: 44.75
OS_K2POWER_DIOPTERS: 43.50
OS_AXISANGLE_DEGREES: 169
OD_K1POWER_DIOPTERS: 42.25
OD_AXISANGLE_DEGREES: 013
OS_K1POWER_DIOPTERS: 43.00

## 2023-02-17 ASSESSMENT — SPHEQUIV_DERIVED
OS_SPHEQUIV: 0
OD_SPHEQUIV: -2.75

## 2023-02-17 ASSESSMENT — LID EXAM ASSESSMENTS
OS_BLEPHARITIS: LLL 1+
OD_BLEPHARITIS: RLL 1+

## 2023-02-17 ASSESSMENT — SUPERFICIAL PUNCTATE KERATITIS (SPK)
OD_SPK: 1+
OS_SPK: 1+

## 2023-02-17 ASSESSMENT — REFRACTION_AUTOREFRACTION
OD_AXIS: 105
OS_SPHERE: +0.50
OD_SPHERE: -1.25
OS_CYLINDER: -1.00
OS_AXIS: 080
OD_CYLINDER: -3.00

## 2023-04-26 ENCOUNTER — APPOINTMENT (OUTPATIENT)
Dept: OPHTHALMOLOGY | Facility: CLINIC | Age: 68
End: 2023-04-26

## 2023-07-19 ENCOUNTER — NON-APPOINTMENT (OUTPATIENT)
Age: 68
End: 2023-07-19

## 2023-07-19 ENCOUNTER — APPOINTMENT (OUTPATIENT)
Dept: GASTROENTEROLOGY | Facility: CLINIC | Age: 68
End: 2023-07-19
Payer: MEDICARE

## 2023-07-19 VITALS
HEART RATE: 72 BPM | TEMPERATURE: 97.6 F | WEIGHT: 222 LBS | HEIGHT: 57 IN | RESPIRATION RATE: 16 BRPM | BODY MASS INDEX: 47.9 KG/M2 | DIASTOLIC BLOOD PRESSURE: 60 MMHG | OXYGEN SATURATION: 85 % | SYSTOLIC BLOOD PRESSURE: 123 MMHG

## 2023-07-19 DIAGNOSIS — K57.32 DIVERTICULITIS OF LARGE INTESTINE W/OUT PERFORATION OR ABSCESS W/OUT BLEEDING: ICD-10-CM

## 2023-07-19 DIAGNOSIS — Z09 ENCOUNTER FOR FOLLOW-UP EXAMINATION AFTER COMPLETED TREATMENT FOR CONDITIONS OTHER THAN MALIGNANT NEOPLASM: ICD-10-CM

## 2023-07-19 PROCEDURE — 99203 OFFICE O/P NEW LOW 30 MIN: CPT

## 2023-07-19 RX ORDER — IBUPROFEN 600 MG/1
600 TABLET, FILM COATED ORAL
Qty: 90 | Refills: 0 | Status: DISCONTINUED | COMMUNITY
End: 2023-07-19

## 2023-07-19 RX ORDER — IBUPROFEN 200 MG
600 CAPSULE ORAL
Qty: 60 | Refills: 5 | Status: DISCONTINUED | COMMUNITY
Start: 2018-10-14 | End: 2023-07-19

## 2023-07-19 RX ORDER — OMEPRAZOLE 20 MG/1
20 CAPSULE, DELAYED RELEASE ORAL DAILY
Qty: 90 | Refills: 0 | Status: DISCONTINUED | COMMUNITY
Start: 2018-10-14 | End: 2023-07-19

## 2023-07-19 RX ORDER — CIPROFLOXACIN HYDROCHLORIDE 500 MG/1
500 TABLET, FILM COATED ORAL TWICE DAILY
Qty: 14 | Refills: 0 | Status: DISCONTINUED | COMMUNITY
Start: 2021-09-24 | End: 2023-07-19

## 2023-07-19 RX ORDER — AMOXICILLIN AND CLAVULANATE POTASSIUM 250; 125 MG/1; MG/1
250-125 TABLET, FILM COATED ORAL
Refills: 0 | Status: DISCONTINUED | COMMUNITY
End: 2023-07-19

## 2023-07-19 RX ORDER — CYCLOBENZAPRINE HYDROCHLORIDE 10 MG/1
10 TABLET, FILM COATED ORAL
Qty: 30 | Refills: 2 | Status: DISCONTINUED | COMMUNITY
Start: 2018-10-14 | End: 2023-07-19

## 2023-07-19 RX ORDER — ERGOCALCIFEROL 1.25 MG/1
1.25 MG CAPSULE, LIQUID FILLED ORAL
Qty: 12 | Refills: 0 | Status: DISCONTINUED | COMMUNITY
Start: 2017-12-31 | End: 2023-07-19

## 2023-07-19 RX ORDER — FLURBIPROFEN 100 MG
100 TABLET ORAL
Qty: 180 | Refills: 0 | Status: DISCONTINUED | COMMUNITY
Start: 2018-10-31 | End: 2023-07-19

## 2023-07-19 NOTE — ASSESSMENT
[FreeTextEntry1] : We will obtain the records and ordered some additional blood work including thyroid function level.  We will check CBC and CMP.  We will schedule her for EGD and a colonoscopy at [facility].\par \par \par The bowel preparation was discussed at length. Risks (including bleeding, pain, perforation, incomplete examination, splenic laceration, adverse reactions to medications, aspiration and death), benefits and alternatives were discussed. Patient is agreeable for the colonoscopy. The patient is medically optimized for the procedure. We will schedule the patient for the procedure. Bowel preparation was sent to the pharmacy.\par \par I have also recommended to start using fiber supplementation on a regular basis to help with the constipation\par \par \par Romero Quintana MD\par Gastroenterology \par \par

## 2023-07-19 NOTE — PHYSICAL EXAM
[Alert] : alert [Normal Voice/Communication] : normal voice/communication [Healthy Appearing] : healthy appearing [No Acute Distress] : no acute distress [Sclera] : the sclera and conjunctiva were normal [Hearing Threshold Finger Rub Not [place]] : hearing was normal [Normal Lips/Gums] : the lips and gums were normal [Oropharynx] : the oropharynx was normal [Normal Appearance] : the appearance of the neck was normal [No Neck Mass] : no neck mass was observed [No Respiratory Distress] : no respiratory distress [No Acc Muscle Use] : no accessory muscle use [Respiration, Rhythm And Depth] : normal respiratory rhythm and effort [Auscultation Breath Sounds / Voice Sounds] : lungs were clear to auscultation bilaterally [Heart Rate And Rhythm] : heart rate was normal and rhythm regular [Normal S1, S2] : normal S1 and S2 [Murmurs] : no murmurs [Bowel Sounds] : normal bowel sounds [Abdomen Tenderness] : non-tender [No Masses] : no abdominal mass palpated [Abdomen Soft] : soft [] : no hepatosplenomegaly [Oriented To Time, Place, And Person] : oriented to person, place, and time

## 2023-07-19 NOTE — HISTORY OF PRESENT ILLNESS
[FreeTextEntry1] : Patient has arrived for the consultation.  The patient has been evaluated in the emergency room last year for episode of diverticulitis.  Then there was concern of epiploic appendagitis.  She has been also complaining of acid reflux.  She is not on any PPI.  Besides that she has constipation and passes stool small bowel movement per day.  She has hypothyroidism and follows regularly with her primary care doctor.  Last visit was 3 months ago.  She has denied any other complaints of any rectal bleeding or any significant weight loss.  She had a remote colonoscopy.  It is unclear whether she had any endoscopy at that time. [de-identified] :  ACC: [id number] EXAM:  CT ABDOMEN AND PELVIS IC                      \par \par PROCEDURE DATE:  10/03/2022  \par \par \par \par INTERPRETATION:  CLINICAL INFORMATION: Diverticulitis, complication \par suspected\par \par \par COMPARISON: September 30, 2022\par \par CONTRAST/COMPLICATIONS:\par IV Contrast: Omnipaque 350  97 cc administered   3 cc discarded\par Oral Contrast: NONE\par Complications: None reported at time of study completion\par \par PROCEDURE:\par CT of the Abdomen and Pelvis was performed.\par Sagittal and coronal reformats were performed.\par \par FINDINGS:\par LOWER CHEST: Mild bibasilar subsegmental atelectasis.\par \par LIVER: Within normal limits.\par BILE DUCTS: Normal caliber.\par GALLBLADDER: Within normal limits.\par SPLEEN: Within normal limits.\par PANCREAS: Within normal limits.\par ADRENALS: Within normal limits.\par KIDNEYS/URETERS: Within normal limits.\par \par BLADDER: Within normal limits.\par REPRODUCTIVE ORGANS: Uterus and adnexa within normal limits. Small \par calcified uterine fibroid on the left measuring 11 mm.\par \par Stomach is not well distended. No focal gastric mass. No gastric outlet \par obstruction.\par \par Within the fat adjacent to the left lateral abdominal wall was \par circumscribed area with central increased attenuation likely reflecting \par area of epiploic appendicitis axial 3-83/coronal 5-80.\par \par Diverticulosis descending and sigmoid portions the colon without acute \par diverticulitis. No colitis.\par \par BOWEL: No bowel obstruction. Appendix is normal.\par PERITONEUM: Small amount of fluid within the paracolic gutters \par bilaterally.\par No free air or abscess.\par VESSELS: Within normal limits.\par RETROPERITONEUM/LYMPH NODES: No lymphadenopathy.\par ABDOMINAL WALL: 2.5 cm fat-containing left inguinal hernia.\par BONES: Within normal limits.\par AI VERTEBRAL BODY ANALYSIS:\par L3: low bone density of 97 HU, suspicious for osteoporosis.\par \par IMPRESSION:\par \par Within the fat adjacent to the left lateral abdominal wall was \par circumscribed area with central increased attenuation likely reflecting \par area of epiploic appendicitis axial 3-83/coronal 5-80.\par \par Diverticulosis descending and sigmoid portions the colon without acute \par diverticulitis. No colitis.\par \par Small amount of free fluid within the paracolic gutters bilaterally\par \par No bowel obstruction, free air or abscess.\par \par VERTEBRAL BODY ANALYSIS: Low bone density as described above, consider \par further workup for osteoporosis.\par \par \par \par --- End of Report ---\par \par \par \par \par \par NETO BOOTH MD; Attending Radiologist\par This document has been electronically signed. Oct  3 2022 12:58AM\par  [de-identified] :  ACC: [id number] EXAM:  CT ABDOMEN AND PELVIS IC                      \par \par PROCEDURE DATE:  09/30/2022  \par \par \par \par INTERPRETATION:  CLINICAL INFORMATION: 67 years  Female with left lower \par quadrant pain.\par \par COMPARISON: Noncontrast CT 1/24/2019\par \par CONTRAST/COMPLICATIONS:\par IV Contrast: Omnipaque 350  85 cc administered   0 cc discarded\par Oral Contrast: NONE\par Complications: None reported at time of study completion\par \par PROCEDURE:\par CT of the Abdomen and Pelvis was performed.\par Sagittal and coronal reformats were performed.\par \par FINDINGS:\par LOWER CHEST: Motion artifact. Mild cardiomegaly.\par \par LIVER: Within normal limits.\par BILE DUCTS: Normal caliber.\par GALLBLADDER: Within normal limits.\par SPLEEN: Within normal limits.\par PANCREAS: Within normal limits.\par ADRENALS: Within normal limits.\par KIDNEYS/URETERS: Within normal limits.\par \par BLADDER: Within normal limits.\par REPRODUCTIVE ORGANS: 1 cm calcified uterine myoma.\par \par BOWEL: No bowel obstruction. Appendix is normal.. Descending and sigmoid \par colonic diverticulosis. Inflamed mid sigmoid diverticula (3:100 and 5:66) \par with mild adjacent fat stranding. No extraluminal gas. No intramural or \par intra-abdominal abscess. Small sliding hiatus hernia with mildly \par thickened distal esophagus.\par PERITONEUM: No ascites.\par VESSELS: Within normal limits.\par RETROPERITONEUM/LYMPH NODES: No lymphadenopathy.\par ABDOMINAL WALL: Small bilateral fat-containing inguinal hernias.\par BONES: Within normal limits.\par AI VERTEBRAL BODY ANALYSIS:\par L2: low bone density of 95 HU, suspicious for osteoporosis.\par L3: low bone density of 98 HU, suspicious for osteoporosis.\par \par IMPRESSION:\par \par Mild uncomplicated sigmoid diverticulitis.\par \par Small sliding hiatus hernia with mildly thickened distal esophagus. \par Consider endoscopy.\par \par VERTEBRAL BODY ANALYSIS: Low bone density as described above, consider \par further workup for osteoporosis.\par \par \par \par --- End of Report ---\par \par \par \par \par \par KRISTOPHER NUNN MD; Attending Radiologist\par This document has been electronically signed. Sep 30 2022  4:56PM\par

## 2023-08-01 ENCOUNTER — OFFICE (OUTPATIENT)
Dept: URBAN - METROPOLITAN AREA CLINIC 112 | Facility: CLINIC | Age: 68
Setting detail: OPHTHALMOLOGY
End: 2023-08-01
Payer: COMMERCIAL

## 2023-08-01 DIAGNOSIS — H01.002: ICD-10-CM

## 2023-08-01 DIAGNOSIS — H40.31X2: ICD-10-CM

## 2023-08-01 DIAGNOSIS — H01.005: ICD-10-CM

## 2023-08-01 DIAGNOSIS — H40.052: ICD-10-CM

## 2023-08-01 DIAGNOSIS — H16.223: ICD-10-CM

## 2023-08-01 PROBLEM — H16.221 DRY EYE SYNDROME K SICCA; RIGHT EYE, LEFT EYE, BOTH EYES: Status: ACTIVE | Noted: 2023-08-01

## 2023-08-01 PROBLEM — H16.222 DRY EYE SYNDROME K SICCA; RIGHT EYE, LEFT EYE, BOTH EYES: Status: ACTIVE | Noted: 2023-08-01

## 2023-08-01 PROCEDURE — 92012 INTRM OPH EXAM EST PATIENT: CPT | Performed by: OPHTHALMOLOGY

## 2023-08-01 PROCEDURE — 83861 MICROFLUID ANALY TEARS: CPT | Performed by: OPHTHALMOLOGY

## 2023-08-01 ASSESSMENT — PACHYMETRY
OS_CT_CORRECTION: 1
OD_CT_CORRECTION: 2
OS_CT_UM: 523
OD_CT_UM: 516

## 2023-08-01 ASSESSMENT — REFRACTION_CURRENTRX
OD_AXIS: 102
OD_SPHERE: -1.50
OD_ADD: +2.50
OS_OVR_VA: 20/
OS_SPHERE: +0.50
OS_ADD: +3.00
OD_CYLINDER: -2.25
OD_OVR_VA: 20/
OS_AXIS: 079
OS_CYLINDER: -1.25

## 2023-08-01 ASSESSMENT — REFRACTION_AUTOREFRACTION
OS_AXIS: 083
OD_SPHERE: -1.50
OD_CYLINDER: -2.75
OD_AXIS: 105
OS_SPHERE: +0.50
OS_CYLINDER: -1.00

## 2023-08-01 ASSESSMENT — CONFRONTATIONAL VISUAL FIELD TEST (CVF)
OD_FINDINGS: FULL
OS_FINDINGS: FULL

## 2023-08-01 ASSESSMENT — TONOMETRY
OS_IOP_MMHG: 17
OD_IOP_MMHG: 14

## 2023-08-01 ASSESSMENT — SPHEQUIV_DERIVED
OD_SPHEQUIV: -2.875
OS_SPHEQUIV: -0.125
OD_SPHEQUIV: -2.875
OS_SPHEQUIV: 0

## 2023-08-01 ASSESSMENT — REFRACTION_MANIFEST
OD_AXIS: 105
OS_SPHERE: +0.50
OD_SPHERE: -1.50
OD_CYLINDER: -2.75
OS_AXIS: 085
OS_VA1: 20/25+2
OD_VA1: 20/40-2
OS_CYLINDER: -1.25

## 2023-08-01 ASSESSMENT — KERATOMETRY
OS_AXISANGLE_DEGREES: 166
OD_K1POWER_DIOPTERS: 42.50
METHOD_AUTO_MANUAL: AUTO
OS_K2POWER_DIOPTERS: 43.50
OD_K2POWER_DIOPTERS: 45.00
OD_AXISANGLE_DEGREES: 014
OS_K1POWER_DIOPTERS: 43.00

## 2023-08-01 ASSESSMENT — LID EXAM ASSESSMENTS
OS_BLEPHARITIS: LLL 1+
OD_BLEPHARITIS: RLL 1+

## 2023-08-01 ASSESSMENT — VISUAL ACUITY
OS_BCVA: 20/250-
OD_BCVA: 20/25+

## 2023-08-01 ASSESSMENT — AXIALLENGTH_DERIVED
OS_AL: 23.7333
OD_AL: 24.6671
OS_AL: 23.6841
OD_AL: 24.6671

## 2023-08-01 ASSESSMENT — SUPERFICIAL PUNCTATE KERATITIS (SPK)
OD_SPK: 1+
OS_SPK: 1+

## 2023-08-01 ASSESSMENT — TEAR BREAK UP TIME (TBUT)
OD_TBUT: T
OS_TBUT: T

## 2023-08-17 LAB
ALBUMIN SERPL ELPH-MCNC: 4.4 G/DL
ALP BLD-CCNC: 69 U/L
ALT SERPL-CCNC: 28 U/L
ANION GAP SERPL CALC-SCNC: 11 MMOL/L
AST SERPL-CCNC: 25 U/L
BILIRUB SERPL-MCNC: 0.5 MG/DL
BUN SERPL-MCNC: 11 MG/DL
CALCIUM SERPL-MCNC: 9.9 MG/DL
CHLORIDE SERPL-SCNC: 105 MMOL/L
CO2 SERPL-SCNC: 26 MMOL/L
CREAT SERPL-MCNC: 0.76 MG/DL
EGFR: 85 ML/MIN/1.73M2
GLUCOSE SERPL-MCNC: 106 MG/DL
IGA SER QL IEP: 404 MG/DL
INR PPP: 0.97 RATIO
POTASSIUM SERPL-SCNC: 4.3 MMOL/L
PROT SERPL-MCNC: 7.6 G/DL
PT BLD: 11 SEC
SODIUM SERPL-SCNC: 142 MMOL/L
TSH SERPL-ACNC: 2.7 UIU/ML

## 2023-08-21 LAB
TTG IGA SER IA-ACNC: 3.2 U/ML
TTG IGA SER-ACNC: NEGATIVE
TTG IGG SER IA-ACNC: 4.7 U/ML
TTG IGG SER IA-ACNC: NEGATIVE

## 2023-08-29 NOTE — PHYSICAL EXAM

## 2023-08-30 ENCOUNTER — TRANSCRIPTION ENCOUNTER (OUTPATIENT)
Age: 68
End: 2023-08-30

## 2023-08-30 ENCOUNTER — APPOINTMENT (OUTPATIENT)
Dept: GASTROENTEROLOGY | Facility: HOSPITAL | Age: 68
End: 2023-08-30

## 2023-08-30 ENCOUNTER — OUTPATIENT (OUTPATIENT)
Dept: OUTPATIENT SERVICES | Facility: HOSPITAL | Age: 68
LOS: 1 days | End: 2023-08-30
Payer: MEDICARE

## 2023-08-30 ENCOUNTER — RESULT REVIEW (OUTPATIENT)
Age: 68
End: 2023-08-30

## 2023-08-30 DIAGNOSIS — K59.00 CONSTIPATION, UNSPECIFIED: ICD-10-CM

## 2023-08-30 DIAGNOSIS — K21.9 GASTRO-ESOPHAGEAL REFLUX DISEASE WITHOUT ESOPHAGITIS: ICD-10-CM

## 2023-08-30 DIAGNOSIS — K20.80 OTHER ESOPHAGITIS WITHOUT BLEEDING: ICD-10-CM

## 2023-08-30 DIAGNOSIS — H00.012 HORDEOLUM EXTERNUM RIGHT LOWER EYELID: ICD-10-CM

## 2023-08-30 DIAGNOSIS — K21.9 GASTRO-ESOPHAGEAL REFLUX DISEASE W/OUT ESOPHAGITIS: ICD-10-CM

## 2023-08-30 DIAGNOSIS — K57.32 DIVERTICULITIS OF LARGE INTESTINE WITHOUT PERFORATION OR ABSCESS WITHOUT BLEEDING: ICD-10-CM

## 2023-08-30 PROCEDURE — 88305 TISSUE EXAM BY PATHOLOGIST: CPT | Mod: 26

## 2023-08-30 PROCEDURE — 45380 COLONOSCOPY AND BIOPSY: CPT | Mod: PT

## 2023-08-30 PROCEDURE — 43239 EGD BIOPSY SINGLE/MULTIPLE: CPT | Mod: 59

## 2023-08-30 PROCEDURE — 88342 IMHCHEM/IMCYTCHM 1ST ANTB: CPT | Mod: 26

## 2023-08-30 DEVICE — NAIL OSTEO 1.5X16MM STRL: Type: IMPLANTABLE DEVICE | Status: FUNCTIONAL

## 2023-08-30 NOTE — HISTORY OF PRESENT ILLNESS
[FreeTextEntry1] : Patient has arrived for the EGD and colonoscopy.  The patient has been evaluated in the emergency room last year for episode of diverticulitis.  Then there was concern of epiploic appendagitis.  She has been also complaining of acid reflux.  She is not on any PPI.  Besides that she has constipation and passes stool small bowel movement per day.  She has hypothyroidism and follows regularly with her primary care doctor.  Last visit was 3 months ago.  She has denied any other complaints of any rectal bleeding or any significant weight loss.  She had a remote colonoscopy.  It is unclear whether she had any endoscopy at that time.

## 2023-09-01 PROCEDURE — 43239 EGD BIOPSY SINGLE/MULTIPLE: CPT

## 2023-09-01 PROCEDURE — 88342 IMHCHEM/IMCYTCHM 1ST ANTB: CPT

## 2023-09-01 PROCEDURE — 45380 COLONOSCOPY AND BIOPSY: CPT | Mod: PT

## 2023-09-01 PROCEDURE — 88305 TISSUE EXAM BY PATHOLOGIST: CPT

## 2023-09-07 LAB — SURGICAL PATHOLOGY STUDY: SIGNIFICANT CHANGE UP

## 2023-09-08 ENCOUNTER — APPOINTMENT (OUTPATIENT)
Dept: OPHTHALMOLOGY | Facility: CLINIC | Age: 68
End: 2023-09-08

## 2023-09-13 ENCOUNTER — NON-APPOINTMENT (OUTPATIENT)
Age: 68
End: 2023-09-13

## 2023-09-21 ENCOUNTER — OFFICE (OUTPATIENT)
Dept: URBAN - METROPOLITAN AREA CLINIC 112 | Facility: CLINIC | Age: 68
Setting detail: OPHTHALMOLOGY
End: 2023-09-21
Payer: COMMERCIAL

## 2023-09-21 DIAGNOSIS — H40.052: ICD-10-CM

## 2023-09-21 DIAGNOSIS — H40.31X2: ICD-10-CM

## 2023-09-21 DIAGNOSIS — H01.005: ICD-10-CM

## 2023-09-21 DIAGNOSIS — H01.002: ICD-10-CM

## 2023-09-21 PROCEDURE — 92250 FUNDUS PHOTOGRAPHY W/I&R: CPT | Performed by: OPHTHALMOLOGY

## 2023-09-21 PROCEDURE — 99213 OFFICE O/P EST LOW 20 MIN: CPT | Performed by: OPHTHALMOLOGY

## 2023-09-21 ASSESSMENT — REFRACTION_MANIFEST
OD_CYLINDER: -2.75
OD_SPHERE: -1.50
OS_VA1: 20/25+2
OS_CYLINDER: -1.25
OD_VA1: 20/40-2
OS_SPHERE: +0.50
OD_AXIS: 105
OS_AXIS: 085

## 2023-09-21 ASSESSMENT — KERATOMETRY
METHOD_AUTO_MANUAL: AUTO
OD_K2POWER_DIOPTERS: 45.00
OS_K2POWER_DIOPTERS: 43.50
OS_AXISANGLE_DEGREES: 166
OD_AXISANGLE_DEGREES: 014
OD_K1POWER_DIOPTERS: 42.50
OS_K1POWER_DIOPTERS: 43.00

## 2023-09-21 ASSESSMENT — TONOMETRY
OS_IOP_MMHG: 19
OD_IOP_MMHG: 17

## 2023-09-21 ASSESSMENT — REFRACTION_CURRENTRX
OS_SPHERE: +0.50
OD_ADD: +2.50
OS_CYLINDER: -1.25
OD_AXIS: 102
OS_OVR_VA: 20/
OS_ADD: +3.00
OS_AXIS: 079
OD_SPHERE: -1.50
OD_CYLINDER: -2.25
OD_OVR_VA: 20/

## 2023-09-21 ASSESSMENT — VISUAL ACUITY
OD_BCVA: 20/30
OS_BCVA: 20/40

## 2023-09-21 ASSESSMENT — CONFRONTATIONAL VISUAL FIELD TEST (CVF)
OS_FINDINGS: FULL
OD_FINDINGS: FULL

## 2023-09-21 ASSESSMENT — AXIALLENGTH_DERIVED
OS_AL: 23.7333
OS_AL: 23.5863
OD_AL: 24.6671
OD_AL: 24.774

## 2023-09-21 ASSESSMENT — SPHEQUIV_DERIVED
OD_SPHEQUIV: -2.875
OS_SPHEQUIV: 0.25
OS_SPHEQUIV: -0.125
OD_SPHEQUIV: -3.125

## 2023-09-21 ASSESSMENT — PACHYMETRY
OS_CT_UM: 523
OD_CT_CORRECTION: 2
OS_CT_CORRECTION: 1
OD_CT_UM: 516

## 2023-09-21 ASSESSMENT — LID EXAM ASSESSMENTS
OS_BLEPHARITIS: LLL 1+
OD_BLEPHARITIS: RLL 1+

## 2023-09-21 ASSESSMENT — REFRACTION_AUTOREFRACTION
OS_AXIS: 081
OS_CYLINDER: -1.00
OD_CYLINDER: -2.75
OD_SPHERE: -1.75
OS_SPHERE: +0.75
OD_AXIS: 106

## 2023-09-21 ASSESSMENT — SUPERFICIAL PUNCTATE KERATITIS (SPK)
OS_SPK: T
OD_SPK: T

## 2023-10-12 ENCOUNTER — APPOINTMENT (OUTPATIENT)
Dept: OPHTHALMOLOGY | Facility: CLINIC | Age: 68
End: 2023-10-12

## 2023-11-01 ENCOUNTER — APPOINTMENT (OUTPATIENT)
Dept: CARDIOLOGY | Facility: CLINIC | Age: 68
End: 2023-11-01
Payer: MEDICARE

## 2023-11-01 VITALS
SYSTOLIC BLOOD PRESSURE: 140 MMHG | TEMPERATURE: 97.8 F | BODY MASS INDEX: 45.74 KG/M2 | HEART RATE: 59 BPM | HEIGHT: 57 IN | WEIGHT: 212 LBS | DIASTOLIC BLOOD PRESSURE: 76 MMHG | OXYGEN SATURATION: 95 %

## 2023-11-01 VITALS — SYSTOLIC BLOOD PRESSURE: 147 MMHG | DIASTOLIC BLOOD PRESSURE: 80 MMHG

## 2023-11-01 PROCEDURE — 99203 OFFICE O/P NEW LOW 30 MIN: CPT

## 2023-11-01 RX ORDER — LEVOTHYROXINE SODIUM 0.05 MG/1
50 TABLET ORAL DAILY
Refills: 0 | Status: ACTIVE | COMMUNITY

## 2023-11-01 RX ORDER — POLYETHYLENE GLYCOL 3350 AND ELECTROLYTES WITH LEMON FLAVOR 236; 22.74; 6.74; 5.86; 2.97 G/4L; G/4L; G/4L; G/4L; G/4L
236 POWDER, FOR SOLUTION ORAL
Qty: 1 | Refills: 0 | Status: DISCONTINUED | COMMUNITY
Start: 2023-07-19 | End: 2023-11-01

## 2023-11-01 RX ORDER — NAPROXEN 500 MG/1
500 TABLET ORAL
Refills: 0 | Status: DISCONTINUED | COMMUNITY
End: 2023-11-01

## 2023-11-01 RX ORDER — BISMUTH SUBSALICYLATE 262 MG/1
262 TABLET, CHEWABLE ORAL 4 TIMES DAILY
Qty: 56 | Refills: 0 | Status: DISCONTINUED | COMMUNITY
Start: 2023-09-07 | End: 2023-11-01

## 2023-11-01 RX ORDER — OMEPRAZOLE 20 MG/1
20 CAPSULE, DELAYED RELEASE ORAL TWICE DAILY
Qty: 28 | Refills: 0 | Status: DISCONTINUED | COMMUNITY
Start: 2023-09-07 | End: 2023-11-01

## 2023-11-01 RX ORDER — ATORVASTATIN CALCIUM 10 MG/1
10 TABLET, FILM COATED ORAL
Refills: 0 | Status: ACTIVE | COMMUNITY

## 2023-11-01 RX ORDER — CITALOPRAM HYDROBROMIDE 10 MG/1
10 TABLET, FILM COATED ORAL DAILY
Refills: 0 | Status: ACTIVE | COMMUNITY

## 2023-11-01 RX ORDER — DOXYCYCLINE HYCLATE 100 MG/1
100 CAPSULE ORAL
Qty: 28 | Refills: 0 | Status: DISCONTINUED | COMMUNITY
Start: 2023-09-07 | End: 2023-11-01

## 2023-11-01 RX ORDER — PANTOPRAZOLE 40 MG/1
40 TABLET, DELAYED RELEASE ORAL DAILY
Qty: 30 | Refills: 4 | Status: DISCONTINUED | COMMUNITY
Start: 2023-08-30 | End: 2023-11-01

## 2023-11-01 RX ORDER — METRONIDAZOLE 250 MG/1
250 TABLET ORAL EVERY 6 HOURS
Qty: 56 | Refills: 0 | Status: DISCONTINUED | COMMUNITY
Start: 2023-09-07 | End: 2023-11-01

## 2023-11-01 RX ORDER — FUROSEMIDE 20 MG/1
20 TABLET ORAL DAILY
Qty: 30 | Refills: 5 | Status: ACTIVE | COMMUNITY
Start: 2023-11-01 | End: 1900-01-01

## 2023-11-03 ENCOUNTER — OFFICE (OUTPATIENT)
Dept: URBAN - METROPOLITAN AREA CLINIC 94 | Facility: CLINIC | Age: 68
Setting detail: OPHTHALMOLOGY
End: 2023-11-03
Payer: COMMERCIAL

## 2023-11-03 DIAGNOSIS — H01.002: ICD-10-CM

## 2023-11-03 DIAGNOSIS — H01.005: ICD-10-CM

## 2023-11-03 DIAGNOSIS — H40.052: ICD-10-CM

## 2023-11-03 DIAGNOSIS — H40.31X2: ICD-10-CM

## 2023-11-03 PROBLEM — H26.491 POSTERIOR CAPSULAR OPACIFICATION; RIGHT EYE: Status: ACTIVE | Noted: 2023-11-03

## 2023-11-03 PROCEDURE — 92012 INTRM OPH EXAM EST PATIENT: CPT | Performed by: OPHTHALMOLOGY

## 2023-11-03 PROCEDURE — 92083 EXTENDED VISUAL FIELD XM: CPT | Performed by: OPHTHALMOLOGY

## 2023-11-03 PROCEDURE — 92133 CPTRZD OPH DX IMG PST SGM ON: CPT | Performed by: OPHTHALMOLOGY

## 2023-11-03 ASSESSMENT — REFRACTION_CURRENTRX
OD_ADD: +2.50
OD_CYLINDER: -2.25
OS_SPHERE: +0.50
OS_CYLINDER: -1.25
OS_OVR_VA: 20/
OD_OVR_VA: 20/
OS_ADD: +3.00
OD_SPHERE: -1.50
OD_AXIS: 102
OS_AXIS: 079

## 2023-11-03 ASSESSMENT — REFRACTION_AUTOREFRACTION
OS_CYLINDER: -1.00
OD_SPHERE: -1.00
OD_AXIS: 102
OS_AXIS: 086
OD_CYLINDER: -3.00
OS_SPHERE: +0.75

## 2023-11-03 ASSESSMENT — REFRACTION_MANIFEST
OS_VA1: 20/25+2
OS_SPHERE: +0.50
OD_ADD: +2.75
OS_CYLINDER: -0.75
OS_CYLINDER: -1.25
OD_SPHERE: -1.50
OD_AXIS: 105
OD_VA1: 20/25
OD_CYLINDER: -2.75
OD_VA1: 20/40-2
OS_VA1: 20/20
OD_CYLINDER: -2.75
OS_ADD: +2.75
OD_SPHERE: -1.00
OD_AXIS: 100
OS_SPHERE: +0.50
OS_AXIS: 085
OS_AXIS: 085

## 2023-11-03 ASSESSMENT — SUPERFICIAL PUNCTATE KERATITIS (SPK)
OS_SPK: T
OD_SPK: T

## 2023-11-03 ASSESSMENT — SPHEQUIV_DERIVED
OS_SPHEQUIV: -0.125
OD_SPHEQUIV: -2.375
OS_SPHEQUIV: 0.25
OD_SPHEQUIV: -2.875
OS_SPHEQUIV: 0.125
OD_SPHEQUIV: -2.5

## 2023-11-03 ASSESSMENT — LID EXAM ASSESSMENTS
OD_BLEPHARITIS: RLL 1+
OS_BLEPHARITIS: LLL 1+

## 2023-11-03 ASSESSMENT — CONFRONTATIONAL VISUAL FIELD TEST (CVF)
OD_FINDINGS: FULL
OS_FINDINGS: FULL

## 2023-11-09 ENCOUNTER — APPOINTMENT (OUTPATIENT)
Dept: PULMONOLOGY | Facility: CLINIC | Age: 68
End: 2023-11-09

## 2023-11-15 ENCOUNTER — APPOINTMENT (OUTPATIENT)
Dept: GASTROENTEROLOGY | Facility: CLINIC | Age: 68
End: 2023-11-15
Payer: MEDICARE

## 2023-11-15 VITALS
HEART RATE: 65 BPM | DIASTOLIC BLOOD PRESSURE: 50 MMHG | OXYGEN SATURATION: 95 % | BODY MASS INDEX: 46.17 KG/M2 | HEIGHT: 57 IN | SYSTOLIC BLOOD PRESSURE: 117 MMHG | RESPIRATION RATE: 16 BRPM | WEIGHT: 214 LBS

## 2023-11-15 DIAGNOSIS — A04.8 OTHER SPECIFIED BACTERIAL INTESTINAL INFECTIONS: ICD-10-CM

## 2023-11-15 PROCEDURE — 99213 OFFICE O/P EST LOW 20 MIN: CPT

## 2023-11-30 ENCOUNTER — APPOINTMENT (OUTPATIENT)
Dept: BREAST CENTER | Facility: CLINIC | Age: 68
End: 2023-11-30

## 2023-11-30 ENCOUNTER — NON-APPOINTMENT (OUTPATIENT)
Age: 68
End: 2023-11-30

## 2023-12-04 ENCOUNTER — APPOINTMENT (OUTPATIENT)
Dept: CARDIOLOGY | Facility: CLINIC | Age: 68
End: 2023-12-04
Payer: MEDICARE

## 2023-12-04 PROCEDURE — 93306 TTE W/DOPPLER COMPLETE: CPT

## 2023-12-07 LAB — H PYLORI AG STL QL: POSITIVE

## 2023-12-13 ENCOUNTER — NON-APPOINTMENT (OUTPATIENT)
Age: 68
End: 2023-12-13

## 2023-12-14 ENCOUNTER — NON-APPOINTMENT (OUTPATIENT)
Age: 68
End: 2023-12-14

## 2023-12-14 ENCOUNTER — APPOINTMENT (OUTPATIENT)
Dept: CARDIOLOGY | Facility: CLINIC | Age: 68
End: 2023-12-14
Payer: MEDICARE

## 2023-12-14 VITALS
DIASTOLIC BLOOD PRESSURE: 78 MMHG | WEIGHT: 211.64 LBS | HEIGHT: 57 IN | OXYGEN SATURATION: 95 % | BODY MASS INDEX: 45.66 KG/M2 | SYSTOLIC BLOOD PRESSURE: 140 MMHG | HEART RATE: 71 BPM

## 2023-12-14 LAB
ANION GAP SERPL CALC-SCNC: 11 MMOL/L
BUN SERPL-MCNC: 11 MG/DL
CALCIUM SERPL-MCNC: 9.7 MG/DL
CHLORIDE SERPL-SCNC: 101 MMOL/L
CO2 SERPL-SCNC: 27 MMOL/L
CREAT SERPL-MCNC: 0.75 MG/DL
EGFR: 86 ML/MIN/1.73M2
GLUCOSE SERPL-MCNC: 97 MG/DL
NT-PROBNP SERPL-MCNC: 94 PG/ML
POTASSIUM SERPL-SCNC: 4.5 MMOL/L
SODIUM SERPL-SCNC: 139 MMOL/L

## 2023-12-14 PROCEDURE — 99213 OFFICE O/P EST LOW 20 MIN: CPT

## 2024-01-15 ENCOUNTER — APPOINTMENT (OUTPATIENT)
Dept: GASTROENTEROLOGY | Facility: CLINIC | Age: 69
End: 2024-01-15

## 2024-03-20 ENCOUNTER — APPOINTMENT (OUTPATIENT)
Dept: PULMONOLOGY | Facility: CLINIC | Age: 69
End: 2024-03-20
Payer: MEDICARE

## 2024-03-20 VITALS — RESPIRATION RATE: 16 BRPM | OXYGEN SATURATION: 98 % | HEART RATE: 58 BPM

## 2024-03-20 VITALS — WEIGHT: 214 LBS | HEIGHT: 57 IN | BODY MASS INDEX: 46.17 KG/M2

## 2024-03-20 VITALS — DIASTOLIC BLOOD PRESSURE: 80 MMHG | SYSTOLIC BLOOD PRESSURE: 130 MMHG

## 2024-03-20 DIAGNOSIS — J98.4 OTHER DISORDERS OF LUNG: ICD-10-CM

## 2024-03-20 PROCEDURE — 99205 OFFICE O/P NEW HI 60 MIN: CPT | Mod: 25

## 2024-03-20 PROCEDURE — 94010 BREATHING CAPACITY TEST: CPT

## 2024-03-20 RX ORDER — AMOXICILLIN 500 MG/1
500 TABLET, FILM COATED ORAL 3 TIMES DAILY
Qty: 84 | Refills: 0 | Status: DISCONTINUED | COMMUNITY
Start: 2023-12-13 | End: 2024-03-20

## 2024-03-20 RX ORDER — RIFABUTIN 150 MG/1
150 CAPSULE ORAL
Qty: 14 | Refills: 0 | Status: DISCONTINUED | COMMUNITY
Start: 2023-12-13 | End: 2024-03-20

## 2024-03-20 RX ORDER — OMEPRAZOLE 20 MG/1
20 CAPSULE, DELAYED RELEASE ORAL TWICE DAILY
Qty: 28 | Refills: 0 | Status: DISCONTINUED | COMMUNITY
Start: 2023-12-13 | End: 2024-03-20

## 2024-03-20 NOTE — PHYSICAL EXAM
Increase Lisinopril to 40mg daily.  Can take two tablets of the 20mg lisinopril until gone.    Salt and caffeine can increase blood pressure as well.   [No Acute Distress] : no acute distress [Normal Oropharynx] : normal oropharynx [Neck Circumference: ___] : neck circumference: [unfilled] [IV] : Mallampati Class: IV [Normal Appearance] : normal appearance [No Neck Mass] : no neck mass [Normal S1, S2] : normal s1, s2 [Normal Rate/Rhythm] : normal rate/rhythm [No Murmurs] : no murmurs [No Resp Distress] : no resp distress [Clear to Auscultation Bilaterally] : clear to auscultation bilaterally [No Abnormalities] : no abnormalities [Benign] : benign [Normal Gait] : normal gait [No Clubbing] : no clubbing [No Cyanosis] : no cyanosis [FROM] : FROM [1+ Pitting] : 1+ pitting [Normal Color/ Pigmentation] : normal color/ pigmentation [No Focal Deficits] : no focal deficits [Oriented x3] : oriented x3 [Normal Affect] : normal affect [TextBox_2] : morbidly obese

## 2024-03-20 NOTE — HISTORY OF PRESENT ILLNESS
[Never] : never [Obstructive Sleep Apnea] : obstructive sleep apnea [Awakes Unrefreshed] : awakes unrefreshed [Awakes with Dry Mouth] : awakes with dry mouth [Awakes with Headache] : awakes with headache [Daytime Somnolence] : daytime somnolence [Snoring] : snoring [Difficulty Initiating Sleep] : difficulty initiating sleep [Witnessed Apneas] : witnessed apneas [Full Face mask] : full face mask [TextBox_4] : 69-year-old morbidly obese female seen today for pulmonary evaluation and sleep reevaluation.  Patient was seen by cardiology last year for complaints of dyspnea.  She was originally evaluated by them in 2016 with a cardiac catheterization for complaints of chest pain which were negative and failed to follow-up.  No history of prior lung disease tobacco use and history was previously complicated by hypertension, hyperlipidemia as well as obstructive sleep apnea.  Workup included a normal echocardiogram and there was some improvement in her dyspnea with use of diuretics.  She is seen today for further evaluation.  She denies any complaints of cough, wheeze, sputum production, fevers, chills, chest pains.  She does note some chronic back pain which has been described in the past in previous evaluations.  No change in home or work environment. [TextBox_77] : 0806 [TextBox_79] : 2557 [TextBox_81] : 10 [TextBox_89] : 2 [TextBox_93] : above off CPAP [TextBox_158] : [place] [TextBox_162] : 9/2022 [TextBox_165] : 9 yrs hx  ARIANNA Has ARIANNA, Sleep study Shaq [ESS] : 12

## 2024-03-20 NOTE — REASON FOR VISIT
[Initial] : an initial visit [Sleep Evaluation] : sleep evaluation [Ad Hoc ] : provided by an ad hoc  [Interpreters_FullName] : Andie [Interpreters_Relationshiptopatient] : LPN [TWNoteComboBox1] : Thai

## 2024-03-20 NOTE — END OF VISIT
[FreeTextEntry3] : Prior evaluation at [facility] was obtained and chart was reviewed [Time Spent: ___ minutes] : I have spent [unfilled] minutes of time on the encounter.

## 2024-03-20 NOTE — RESULTS/DATA
[TextEntry] : Pulmonary Functions Testing Results:   Lab Collection Time FEV1 Date/Time	Value	Ref Range	Status 09/10/2021 12:00 AM	1.57	1.64 liters	Final 09/10/2021 12:00 AM	1.59	liters	Final  	 	Comment:  	 	+1%    FVC Date/Time	Value	Ref Range	Status 09/10/2021 12:00 AM	1.79	2.32 liters	Final  	 	Comment:  	 	77%  09/10/2021 12:00 AM	1.94	liters	Final  	 	Comment:  	 	+8%    FEV1/FVC Date/Time	Value	Ref Range	Status 09/10/2021 12:00 AM	88	73 %	Final 09/10/2021 12:00 AM	82	%	Final  	 	Comment:  	 	-7%    TLC Date/Time	Value	Ref Range	Status 09/10/2021 12:00 AM	4.80	4.11 liters	Final  	 	Comment:  	 	117%   DLCO Date/Time	Value	Ref Range	Status 09/10/2021 12:00 AM	19.2	21.7 ml/mmHg sec	Final  	 	Comment:  	 	88%  Essentially normal.    Spirometry performed today demonstrated a forced vital capacity of 1.53 L down from 1.79 L reported in 2021 and an FEV1 of 1.29 L down from 1.59 L in 2021

## 2024-03-20 NOTE — DISCUSSION/SUMMARY
[Obstructive Sleep Apnea] : obstructive sleep apnea [Sleep Study] : sleep study [Alcohol Avoidance] : alcohol avoidance [Sedative Avoidance] : sedative avoidance [Weight Loss Program] : weight loss program [de-identified] : HST [de-identified] : Patient was advised to follow-up with her CPAP machine for interrogation [FreeTextEntry1] : Patient is complains of dyspnea most likely on the basis of obesity deconditioning with chest wall restriction.  I will once again check her BNP as well as D-dimer CHEM profile and CBC for metabolic derangements.  In addition a noncontrast CAT scan is being ordered.

## 2024-03-29 ENCOUNTER — APPOINTMENT (OUTPATIENT)
Dept: CT IMAGING | Facility: CLINIC | Age: 69
End: 2024-03-29
Payer: MEDICARE

## 2024-03-29 ENCOUNTER — OUTPATIENT (OUTPATIENT)
Dept: OUTPATIENT SERVICES | Facility: HOSPITAL | Age: 69
LOS: 1 days | End: 2024-03-29
Payer: MEDICARE

## 2024-03-29 DIAGNOSIS — J98.4 OTHER DISORDERS OF LUNG: ICD-10-CM

## 2024-03-29 DIAGNOSIS — R06.02 SHORTNESS OF BREATH: ICD-10-CM

## 2024-03-29 PROCEDURE — 71250 CT THORAX DX C-: CPT

## 2024-03-29 PROCEDURE — 71250 CT THORAX DX C-: CPT | Mod: 26,MH

## 2024-04-04 ENCOUNTER — RX RENEWAL (OUTPATIENT)
Age: 69
End: 2024-04-04

## 2024-04-10 ENCOUNTER — APPOINTMENT (OUTPATIENT)
Dept: CARDIOLOGY | Facility: CLINIC | Age: 69
End: 2024-04-10
Payer: MEDICARE

## 2024-04-10 ENCOUNTER — RX CHANGE (OUTPATIENT)
Age: 69
End: 2024-04-10

## 2024-04-10 ENCOUNTER — NON-APPOINTMENT (OUTPATIENT)
Age: 69
End: 2024-04-10

## 2024-04-10 VITALS
TEMPERATURE: 97.6 F | OXYGEN SATURATION: 96 % | WEIGHT: 210 LBS | HEIGHT: 57 IN | SYSTOLIC BLOOD PRESSURE: 132 MMHG | HEART RATE: 65 BPM | BODY MASS INDEX: 45.3 KG/M2 | DIASTOLIC BLOOD PRESSURE: 80 MMHG

## 2024-04-10 DIAGNOSIS — E78.5 HYPERLIPIDEMIA, UNSPECIFIED: ICD-10-CM

## 2024-04-10 DIAGNOSIS — E66.01 MORBID (SEVERE) OBESITY DUE TO EXCESS CALORIES: ICD-10-CM

## 2024-04-10 DIAGNOSIS — I10 ESSENTIAL (PRIMARY) HYPERTENSION: ICD-10-CM

## 2024-04-10 DIAGNOSIS — R06.02 SHORTNESS OF BREATH: ICD-10-CM

## 2024-04-10 PROCEDURE — 99213 OFFICE O/P EST LOW 20 MIN: CPT

## 2024-04-10 NOTE — HISTORY OF PRESENT ILLNESS
[FreeTextEntry1] : 69 yo woman, , mother of 4. [place] speaking from [place]. Was last seen in 2016 by Dr Yuen for chest pain. Cardiac cath at that time was normal. Lost to follow up since then. Today to re-establish care. C/O effort related SOB, NYHA 3/4, no chest pain or pressure, sleeps with C-PAP, orthopnea and PND. No palpitations but states that has new bilateral ankle swelling. Has not seen a pulmonary lately.  Had an Echo in 12/2023 that revealed normal LV function. Has seen a pulmonologist and had a CT that revealed mild atelectasis and small nodules.  HTN treated with meds since 2017. HLD treated with meds Hypothyroidism Obesity Prediabetes treated with metformin Nio surgeries Never smoked Doesnt drink alcohol Denies the use of illicit drugs.

## 2024-04-10 NOTE — ASSESSMENT
[FreeTextEntry1] : EKG performed today at the office revealed a NSR 60 bpm, with normal AQRS, SC, QRS and QTc.
done

## 2024-04-10 NOTE — DISCUSSION/SUMMARY
[FreeTextEntry1] : Ms. BRYCE BRAGA is a 68 year female with metabolic Sdme (central obesity, HTN, DM2, HLD). C/O effort related SOB (NYHA 3/4). No chest pain. Has possible ARIANNA. Can be related to either diastolic dysfunction or Pul HTN?  I have recommended to continue the same medications. I also recommended lifestyle modification with weight loss and exercise. We will perform routine blood work Will consider again GLP-1 agonist for weight loss (BMI=45) Will start low dose diuretics furosemide 20 mg once a day and reassess. Routine follow up in 6 months

## 2024-04-10 NOTE — REVIEW OF SYSTEMS
[SOB] : shortness of breath [Dyspnea on exertion] : dyspnea during exertion [Chest Discomfort] : no chest discomfort [Lower Ext Edema] : lower extremity edema [Leg Claudication] : no intermittent leg claudication [Palpitations] : no palpitations [Orthopnea] : orthopnea [PND] : no PND [Syncope] : no syncope [Negative] : Heme/Lymph

## 2024-04-11 LAB — UREA BREATH TEST QL: NEGATIVE

## 2024-04-19 LAB
ALBUMIN SERPL ELPH-MCNC: 4.4 G/DL
ALP BLD-CCNC: 71 U/L
ALT SERPL-CCNC: 23 U/L
ANION GAP SERPL CALC-SCNC: 13 MMOL/L
AST SERPL-CCNC: 21 U/L
BILIRUB DIRECT SERPL-MCNC: 0.1 MG/DL
BILIRUB INDIRECT SERPL-MCNC: 0.2 MG/DL
BILIRUB SERPL-MCNC: 0.4 MG/DL
BUN SERPL-MCNC: 13 MG/DL
CALCIUM SERPL-MCNC: 9.5 MG/DL
CHLORIDE SERPL-SCNC: 104 MMOL/L
CHOLEST SERPL-MCNC: 213 MG/DL
CK SERPL-CCNC: 93 U/L
CO2 SERPL-SCNC: 24 MMOL/L
CREAT SERPL-MCNC: 0.79 MG/DL
EGFR: 80 ML/MIN/1.73M2
ESTIMATED AVERAGE GLUCOSE: 120 MG/DL
GLUCOSE SERPL-MCNC: 102 MG/DL
HBA1C MFR BLD HPLC: 5.8 %
HDLC SERPL-MCNC: 68 MG/DL
LDLC SERPL CALC-MCNC: 131 MG/DL
NONHDLC SERPL-MCNC: 145 MG/DL
POTASSIUM SERPL-SCNC: 4.3 MMOL/L
PROT SERPL-MCNC: 7.2 G/DL
SODIUM SERPL-SCNC: 140 MMOL/L
TRIGL SERPL-MCNC: 77 MG/DL
TSH SERPL-ACNC: 2.67 UIU/ML

## 2024-04-22 LAB
ALBUMIN SERPL ELPH-MCNC: 4.4 G/DL
ALP BLD-CCNC: 72 U/L
ALT SERPL-CCNC: 21 U/L
ANION GAP SERPL CALC-SCNC: 14 MMOL/L
APO LP(A) SERPL-MCNC: 11.9 NMOL/L
AST SERPL-CCNC: 20 U/L
BILIRUB SERPL-MCNC: 0.4 MG/DL
BUN SERPL-MCNC: 13 MG/DL
CALCIUM SERPL-MCNC: 9.7 MG/DL
CHLORIDE SERPL-SCNC: 104 MMOL/L
CO2 SERPL-SCNC: 23 MMOL/L
CREAT SERPL-MCNC: 0.78 MG/DL
DEPRECATED D DIMER PPP IA-ACNC: <150 NG/ML DDU
EGFR: 82 ML/MIN/1.73M2
GLUCOSE SERPL-MCNC: 101 MG/DL
HCT VFR BLD CALC: 37.7 %
HGB BLD-MCNC: 12.2 G/DL
MCHC RBC-ENTMCNC: 28.4 PG
MCHC RBC-ENTMCNC: 32.4 GM/DL
MCV RBC AUTO: 87.7 FL
NT-PROBNP SERPL-MCNC: 117 PG/ML
PLATELET # BLD AUTO: 253 K/UL
POTASSIUM SERPL-SCNC: 4.4 MMOL/L
PROT SERPL-MCNC: 7.1 G/DL
RBC # BLD: 4.3 M/UL
RBC # FLD: 13.8 %
SODIUM SERPL-SCNC: 142 MMOL/L
WBC # FLD AUTO: 7.1 K/UL

## 2024-05-09 ENCOUNTER — OFFICE (OUTPATIENT)
Dept: URBAN - METROPOLITAN AREA CLINIC 112 | Facility: CLINIC | Age: 69
Setting detail: OPHTHALMOLOGY
End: 2024-05-09
Payer: COMMERCIAL

## 2024-05-09 DIAGNOSIS — H01.005: ICD-10-CM

## 2024-05-09 DIAGNOSIS — H16.223: ICD-10-CM

## 2024-05-09 DIAGNOSIS — H01.002: ICD-10-CM

## 2024-05-09 DIAGNOSIS — H16.222: ICD-10-CM

## 2024-05-09 PROBLEM — H16.221 DRY EYE SYNDROME K SICCA; RIGHT EYE, LEFT EYE, BOTH EYES: Status: ACTIVE | Noted: 2024-05-09

## 2024-05-09 PROBLEM — H40.052 OCULAR HYPERTENSION; LEFT EYE: Status: ACTIVE | Noted: 2024-05-09

## 2024-05-09 PROBLEM — H10.45 ALLERGIC CONJUNCTIVITIS: Status: ACTIVE | Noted: 2024-05-09

## 2024-05-09 PROCEDURE — 83861 MICROFLUID ANALY TEARS: CPT | Performed by: OPHTHALMOLOGY

## 2024-05-09 PROCEDURE — 92014 COMPRE OPH EXAM EST PT 1/>: CPT | Performed by: OPHTHALMOLOGY

## 2024-05-09 ASSESSMENT — LID EXAM ASSESSMENTS
OD_BLEPHARITIS: RLL 1+
OS_BLEPHARITIS: LLL 1+

## 2024-05-09 ASSESSMENT — CONFRONTATIONAL VISUAL FIELD TEST (CVF)
OS_FINDINGS: FULL
OD_FINDINGS: FULL

## 2024-06-17 ENCOUNTER — APPOINTMENT (OUTPATIENT)
Dept: RHEUMATOLOGY | Facility: CLINIC | Age: 69
End: 2024-06-17

## 2024-06-17 ENCOUNTER — LABORATORY RESULT (OUTPATIENT)
Age: 69
End: 2024-06-17

## 2024-06-17 VITALS
SYSTOLIC BLOOD PRESSURE: 124 MMHG | TEMPERATURE: 97.6 F | OXYGEN SATURATION: 98 % | HEART RATE: 86 BPM | DIASTOLIC BLOOD PRESSURE: 84 MMHG | HEIGHT: 57 IN

## 2024-06-17 DIAGNOSIS — H04.123 DRY EYE SYNDROME OF BILATERAL LACRIMAL GLANDS: ICD-10-CM

## 2024-06-17 DIAGNOSIS — M75.51 BURSITIS OF RIGHT SHOULDER: ICD-10-CM

## 2024-06-17 DIAGNOSIS — M75.52 BURSITIS OF RIGHT SHOULDER: ICD-10-CM

## 2024-06-17 DIAGNOSIS — M81.0 AGE-RELATED OSTEOPOROSIS W/OUT CURRENT PATHOLOGICAL FRACTURE: ICD-10-CM

## 2024-06-17 DIAGNOSIS — Z87.39 PERSONAL HISTORY OF OTHER DISEASES OF THE MUSCULOSKELETAL SYSTEM AND CONNECTIVE TISSUE: ICD-10-CM

## 2024-06-17 DIAGNOSIS — R68.2 DRY MOUTH, UNSPECIFIED: ICD-10-CM

## 2024-06-17 DIAGNOSIS — M75.21 BICIPITAL TENDINITIS, RIGHT SHOULDER: ICD-10-CM

## 2024-06-17 DIAGNOSIS — M15.9 POLYOSTEOARTHRITIS, UNSPECIFIED: ICD-10-CM

## 2024-06-17 DIAGNOSIS — M25.50 PAIN IN UNSPECIFIED JOINT: ICD-10-CM

## 2024-06-17 DIAGNOSIS — K11.1 HYPERTROPHY OF SALIVARY GLAND: ICD-10-CM

## 2024-06-17 DIAGNOSIS — M19.90 UNSPECIFIED OSTEOARTHRITIS, UNSPECIFIED SITE: ICD-10-CM

## 2024-06-17 DIAGNOSIS — M70.50 OTHER BURSITIS OF KNEE, UNSPECIFIED KNEE: ICD-10-CM

## 2024-06-17 DIAGNOSIS — M75.22 BICIPITAL TENDINITIS, LEFT SHOULDER: ICD-10-CM

## 2024-06-17 DIAGNOSIS — M79.7 FIBROMYALGIA: ICD-10-CM

## 2024-06-17 PROCEDURE — G2212 PROLONG OUTPT/OFFICE VIS: CPT

## 2024-06-17 PROCEDURE — 99205 OFFICE O/P NEW HI 60 MIN: CPT

## 2024-06-17 PROCEDURE — 36415 COLL VENOUS BLD VENIPUNCTURE: CPT

## 2024-06-18 LAB
25(OH)D3 SERPL-MCNC: 42 NG/ML
ALBUMIN MFR SERPL ELPH: 55.8 %
ALBUMIN SERPL ELPH-MCNC: 4.6 G/DL
ALBUMIN SERPL-MCNC: 4.4 G/DL
ALBUMIN/GLOB SERPL: 1.3 RATIO
ALP BLD-CCNC: 71 U/L
ALPHA1 GLOB MFR SERPL ELPH: 2.7 %
ALPHA1 GLOB SERPL ELPH-MCNC: 0.2 G/DL
ALPHA2 GLOB MFR SERPL ELPH: 10.2 %
ALPHA2 GLOB SERPL ELPH-MCNC: 0.8 G/DL
ALT SERPL-CCNC: 24 U/L
ANION GAP SERPL CALC-SCNC: 10 MMOL/L
APPEARANCE: CLEAR
AST SERPL-CCNC: 23 U/L
B-GLOBULIN MFR SERPL ELPH: 14.1 %
B-GLOBULIN SERPL ELPH-MCNC: 1.1 G/DL
BACTERIA: NEGATIVE /HPF
BASOPHILS # BLD AUTO: 0.05 K/UL
BASOPHILS NFR BLD AUTO: 0.6 %
BILIRUB SERPL-MCNC: 0.3 MG/DL
BILIRUBIN URINE: NEGATIVE
BLOOD URINE: NEGATIVE
BUN SERPL-MCNC: 13 MG/DL
CALCIUM SERPL-MCNC: 9.6 MG/DL
CALCIUM SERPL-MCNC: 9.6 MG/DL
CAST: 0 /LPF
CCP AB SER IA-ACNC: <8 UNITS
CHLORIDE SERPL-SCNC: 102 MMOL/L
CK SERPL-CCNC: 107 U/L
CO2 SERPL-SCNC: 26 MMOL/L
COLOR: YELLOW
CORTIS SERPL-MCNC: 6.1 UG/DL
CREAT SERPL-MCNC: 0.72 MG/DL
CREAT SPEC-SCNC: 74 MG/DL
CREAT/PROT UR: 0.1 RATIO
CRP SERPL-MCNC: 7 MG/L
DEPRECATED KAPPA LC FREE/LAMBDA SER: 1.11 RATIO
DSDNA AB SER-ACNC: <1 IU/ML
EGFR: 90 ML/MIN/1.73M2
ENA SS-A AB SER IA-ACNC: 0.2 AL
ENA SS-B AB SER IA-ACNC: <0.2 AL
ENDOMYSIUM IGA SER QL: NEGATIVE
ENDOMYSIUM IGA TITR SER: NORMAL
EOSINOPHIL # BLD AUTO: 0.19 K/UL
EOSINOPHIL NFR BLD AUTO: 2.5 %
EPITHELIAL CELLS: 1 /HPF
ERYTHROCYTE [SEDIMENTATION RATE] IN BLOOD BY WESTERGREN METHOD: 34 MM/HR
GAMMA GLOB FLD ELPH-MCNC: 1.3 G/DL
GAMMA GLOB MFR SERPL ELPH: 17.2 %
GLIADIN IGA SER QL: 3 U/ML
GLIADIN IGG SER QL: <0.4 U/ML
GLIADIN PEPTIDE IGA SER-ACNC: NEGATIVE
GLIADIN PEPTIDE IGG SER-ACNC: NEGATIVE
GLUCOSE QUALITATIVE U: NEGATIVE MG/DL
GLUCOSE SERPL-MCNC: 93 MG/DL
HAV IGM SER QL: NONREACTIVE
HBV CORE IGG+IGM SER QL: NONREACTIVE
HBV CORE IGM SER QL: NONREACTIVE
HBV SURFACE AG SER QL: NONREACTIVE
HCT VFR BLD CALC: 42.1 %
HCV AB SER QL: NONREACTIVE
HCV S/CO RATIO: 0.13 S/CO
HGB BLD-MCNC: 13.2 G/DL
IGA SER QL IEP: 408 MG/DL
IGG SER QL IEP: 1300 MG/DL
IGG SER QL IEP: 1300 MG/DL
IGG1 SER-MCNC: 669 MG/DL
IGG2 SER-MCNC: 427 MG/DL
IGG3 SER-MCNC: 128.6 MG/DL
IGG4 SER-MCNC: 83.1 MG/DL
IGM SER QL IEP: 122 MG/DL
IMM GRANULOCYTES NFR BLD AUTO: 0.3 %
INTERPRETATION SERPL IEP-IMP: NORMAL
KAPPA LC CSF-MCNC: 1.6 MG/DL
KAPPA LC SERPL-MCNC: 1.77 MG/DL
KETONES URINE: NEGATIVE MG/DL
LDH SERPL-CCNC: 179 U/L
LEUKOCYTE ESTERASE URINE: ABNORMAL
LYMPHOCYTES # BLD AUTO: 2.44 K/UL
LYMPHOCYTES NFR BLD AUTO: 31.7 %
M PROTEIN SPEC IFE-MCNC: NORMAL
MAGNESIUM SERPL-MCNC: 2.4 MG/DL
MAN DIFF?: NORMAL
MCHC RBC-ENTMCNC: 28.4 PG
MCHC RBC-ENTMCNC: 31.4 GM/DL
MCV RBC AUTO: 90.7 FL
MICROSCOPIC-UA: NORMAL
MONOCYTES # BLD AUTO: 0.81 K/UL
MONOCYTES NFR BLD AUTO: 10.5 %
NEUTROPHILS # BLD AUTO: 4.19 K/UL
NEUTROPHILS NFR BLD AUTO: 54.4 %
NITRITE URINE: NEGATIVE
PARATHYROID HORMONE INTACT: 37 PG/ML
PH URINE: 5.5
PHOSPHATE SERPL-MCNC: 3.2 MG/DL
PLATELET # BLD AUTO: 294 K/UL
POTASSIUM SERPL-SCNC: 4.2 MMOL/L
PROT SERPL-MCNC: 7.8 G/DL
PROT UR-MCNC: 8 MG/DL
PROTEIN URINE: NEGATIVE MG/DL
RBC # BLD: 4.64 M/UL
RBC # FLD: 13.8 %
RED BLOOD CELLS URINE: 1 /HPF
RF+CCP IGG SER-IMP: NEGATIVE
RHEUMATOID FACT SER QL: 10 IU/ML
SODIUM SERPL-SCNC: 139 MMOL/L
SPECIFIC GRAVITY URINE: 1.02
T3 SERPL-MCNC: 141 NG/DL
T3RU NFR SERPL: 1.2 TBI
T4 FREE SERPL-MCNC: 1.2 NG/DL
T4 SERPL-MCNC: 9.7 UG/DL
THYROGLOB AB SERPL-ACNC: 182 IU/ML
THYROPEROXIDASE AB SERPL IA-ACNC: 870 IU/ML
TSH SERPL-ACNC: 2.96 UIU/ML
TTG IGA SER IA-ACNC: <0.5 U/ML
TTG IGA SER-ACNC: NEGATIVE
TTG IGG SER IA-ACNC: <0.8 U/ML
TTG IGG SER IA-ACNC: NEGATIVE
URATE SERPL-MCNC: 5.1 MG/DL
UROBILINOGEN URINE: 0.2 MG/DL
WBC # FLD AUTO: 7.7 K/UL
WHITE BLOOD CELLS URINE: 4 /HPF

## 2024-06-19 LAB
ACE BLD-CCNC: 23 U/L
ANA PAT FLD IF-IMP: ABNORMAL
ANA SER IF-ACNC: ABNORMAL

## 2024-06-20 PROBLEM — M15.9 OSTEOARTHRITIS, MULTIPLE SITES: Status: RESOLVED | Noted: 2018-10-12 | Resolved: 2024-06-17

## 2024-06-20 PROBLEM — H04.123 BILATERAL DRY EYES: Status: RESOLVED | Noted: 2018-10-12 | Resolved: 2024-06-17

## 2024-06-20 PROBLEM — Z87.39 HISTORY OF FIBROMYALGIA: Status: RESOLVED | Noted: 2018-10-12 | Resolved: 2024-06-17

## 2024-06-20 PROBLEM — Z87.39 HISTORY OF INFLAMMATORY ARTHRITIS: Status: RESOLVED | Noted: 2018-10-12 | Resolved: 2024-06-17

## 2024-06-20 LAB — TRICHINELLA AB SER QL: NEGATIVE

## 2024-06-20 RX ORDER — TOBRAMYCIN AND DEXAMETHASONE 3; 1 MG/ML; MG/ML
0.3-0.1 SUSPENSION/ DROPS OPHTHALMIC
Qty: 5 | Refills: 0 | Status: DISCONTINUED | COMMUNITY
Start: 2024-05-09

## 2024-06-20 RX ORDER — CALCIUM 500 MG
500 TABLET ORAL
Qty: 90 | Refills: 3 | Status: ACTIVE | COMMUNITY
Start: 2024-06-20

## 2024-06-20 RX ORDER — LATANOPROST/PF 0.005 %
0.01 DROPS OPHTHALMIC (EYE)
Qty: 8 | Refills: 0 | Status: ACTIVE | COMMUNITY
Start: 2024-06-14

## 2024-06-20 RX ORDER — MELOXICAM 15 MG/1
15 TABLET ORAL
Qty: 30 | Refills: 1 | Status: ACTIVE | COMMUNITY
Start: 2024-06-20 | End: 1900-01-01

## 2024-06-20 RX ORDER — NEOMYCIN AND POLYMYXIN B SULFATES AND DEXAMETHASONE 3.5; 10000; 1 MG/G; [IU]/G; MG/G
3.5-10000-0.1 OINTMENT OPHTHALMIC
Qty: 4 | Refills: 0 | Status: DISCONTINUED | COMMUNITY
Start: 2024-05-09

## 2024-06-20 RX ORDER — ERGOCALCIFEROL 1.25 MG/1
1.25 MG CAPSULE, LIQUID FILLED ORAL
Qty: 4 | Refills: 1 | Status: ACTIVE | COMMUNITY
Start: 2024-06-20 | End: 1900-01-01

## 2024-06-20 RX ORDER — ASPIRIN ENTERIC COATED TABLETS 81 MG 81 MG/1
81 TABLET, DELAYED RELEASE ORAL
Qty: 100 | Refills: 0 | Status: ACTIVE | COMMUNITY
Start: 2024-06-20

## 2024-06-20 RX ORDER — OLOPATADINE HCL 1 MG/ML
0.1 SOLUTION/ DROPS OPHTHALMIC
Qty: 5 | Refills: 0 | Status: DISCONTINUED | COMMUNITY
Start: 2024-05-06

## 2024-06-20 RX ORDER — ERYTHROMYCIN 5 MG/G
5 OINTMENT OPHTHALMIC
Qty: 3.5 | Refills: 0 | Status: DISCONTINUED | COMMUNITY
Start: 2023-08-30 | End: 2024-06-20

## 2024-06-20 RX ORDER — LEVOTHYROXINE SODIUM 0.03 MG/1
25 TABLET ORAL
Qty: 90 | Refills: 0 | Status: DISCONTINUED | COMMUNITY
Start: 2024-03-31

## 2024-06-20 RX ORDER — CYCLOBENZAPRINE HYDROCHLORIDE 10 MG/1
10 TABLET, FILM COATED ORAL
Qty: 30 | Refills: 1 | Status: ACTIVE | COMMUNITY
Start: 2024-06-20 | End: 1900-01-01

## 2024-06-20 RX ORDER — LIRAGLUTIDE 6 MG/ML
18 INJECTION, SOLUTION SUBCUTANEOUS
Qty: 1 | Refills: 0 | Status: DISCONTINUED | COMMUNITY
Start: 2024-04-10 | End: 2024-06-20

## 2024-06-20 RX ORDER — LISINOPRIL 20 MG/1
20 TABLET ORAL
Qty: 180 | Refills: 0 | Status: DISCONTINUED | COMMUNITY
Start: 2024-06-05

## 2024-06-20 RX ORDER — OMEPRAZOLE 20 MG/1
20 CAPSULE, DELAYED RELEASE ORAL DAILY
Qty: 30 | Refills: 1 | Status: ACTIVE | COMMUNITY
Start: 2024-06-20 | End: 1900-01-01

## 2024-06-20 NOTE — HISTORY OF PRESENT ILLNESS
[FreeTextEntry1] : BRYCE BRAGA is a 69 year old  woman who was referred for further evaluation of joint symptoms and rheumatic diseases. Accompanied by her daughter Opal on the cellphone  One year developed pain right shoulder, upper back pain, low back pain without radiation, and right knee especially after prolong sitting with gelling. Mentions right ankle swelling. 6 months ago developed periorbital swelling especially in the morning. Denies other joint swelling, erythema and heat. PCP performed xrays which revealed "arthritis" in the low back and right knee. Treated Naproxen 500 mg b.i.d. Discontinued Secondary to vertigo with some relief. Denies recent cardio exercise. 2-3 weeks ago saw Orthopedic dx "arthritis" of the right knee. Dry eyes, dry mouth, denies dry skin and genitals. Several years ago bone densitometry results revealed osteopenia. Treated vitamin 50,000 units q.weekly and calcium q.weekly ?dose. New bone densitometry performed May 2024 results pending. Denies any undercooked pork or raw beef consumption. Mentions recent sleep disturbance and fatigue with snoring and uses CPAP machine with relief. Morning stiffness lasting for 15 minutes. No previous history fractures. Menopause at age of 39 with no hormone replacement therapy. Denies family history of arthritis.

## 2024-06-20 NOTE — REASON FOR VISIT
[Consultation] : a consultation visit [Family Member] : family member [Other: _____] : [unfilled] [FreeTextEntry1] :  who was referred for further evaluation of joint symptoms and rheumatic diseases

## 2024-06-20 NOTE — ADDENDUM
[FreeTextEntry1] :  I, Cyril Canseco, acted solely as a scribe for Dr. Myron I. Kleiner, MD. on 06/17/2024. I personally performed the services described in the documentation, reviewed the documentation recorded by the scribe in my presence, and it accurately and completely records my words and actions.

## 2024-06-20 NOTE — PHYSICAL EXAM
[General Appearance - Alert] : alert [General Appearance - In No Acute Distress] : in no acute distress [General Appearance - Well Nourished] : well nourished [General Appearance - Well Developed] : well developed [General Appearance - Well-Appearing] : healthy appearing [Sclera] : the sclera and conjunctiva were normal [PERRL With Normal Accommodation] : pupils were equal in size, round, and reactive to light [Extraocular Movements] : extraocular movements were intact [Outer Ear] : the ears and nose were normal in appearance [Oropharynx] : the oropharynx was normal [Neck Appearance] : the appearance of the neck was normal [Neck Cervical Mass (___cm)] : no neck mass was observed [Jugular Venous Distention Increased] : there was no jugular-venous distention [Thyroid Diffuse Enlargement] : the thyroid was not enlarged [Lungs Percussion] : the lungs were normal to percussion [Heart Rate And Rhythm] : heart rate was normal and rhythm regular [Edema] : there was no peripheral edema [Abdomen Soft] : soft [Abdomen Tenderness] : non-tender [Abdomen Mass (___ Cm)] : no abdominal mass palpated [Cervical Lymph Nodes Enlarged Posterior Bilaterally] : posterior cervical [Cervical Lymph Nodes Enlarged Anterior Bilaterally] : anterior cervical [Supraclavicular Lymph Nodes Enlarged Bilaterally] : supraclavicular [Axillary Lymph Nodes Enlarged Bilaterally] : axillary [No CVA Tenderness] : no ~M costovertebral angle tenderness [No Spinal Tenderness] : no spinal tenderness [Skin Color & Pigmentation] : normal skin color and pigmentation [Skin Turgor] : normal skin turgor [] : no rash [Cranial Nerves] : cranial nerves 2-12 were intact [Sensation] : the sensory exam was normal to light touch and pinprick [Motor Exam] : the motor exam was normal [No Focal Deficits] : no focal deficits [Oriented To Time, Place, And Person] : oriented to person, place, and time [Impaired Insight] : insight and judgment were intact [Affect] : the affect was normal [Mood] : the mood was normal [FreeTextEntry1] : Strength 5/5

## 2024-06-20 NOTE — CONSULT LETTER
[Dear  ___] : Dear  [unfilled], [Consult Letter:] : I had the pleasure of evaluating your patient, [unfilled]. [Please see my note below.] : Please see my note below. [Consult Closing:] : Thank you very much for allowing me to participate in the care of this patient.  If you have any questions, please do not hesitate to contact me. [Sincerely,] : Sincerely, [FreeTextEntry3] : Myron Myron I. Kleiner, M.D., FACR Chief, Division of Rheumatology Department of Medicine [facility] Chief, Division of Rheumatology Department of Medicine [facility]

## 2024-06-20 NOTE — REVIEW OF SYSTEMS
[Negative] : Heme/Lymph [Feeling Tired] : feeling tired [As Noted in HPI] : as noted in HPI [Arthralgias] : arthralgias [Joint Pain] : joint pain [Dry Eyes] : no dryness of the eyes

## 2024-06-20 NOTE — ASSESSMENT
[FreeTextEntry1] : Impression: BRYCE BRAGA is a 69 year old  woman who was referred for further evaluation of joint symptoms and rheumatic diseases. Accompanied by her daughter Opal on the cellphone    One year developed pain right shoulder, upper back pain, low back pain without radiation, and right knee especially after prolong sitting with gelling. Mentions right ankle swelling. 6 months ago developed periorbital swelling especially in the morning. Denies other joint swelling, erythema and heat. PCP performed xrays which revealed "arthritis" in the low back and right knee. Treated Naproxen 500 mg b.i.d. Discontinued Secondary to vertigo with some relief. Denies recent cardio exercise. 2-3 weeks ago saw Orthopedic dx "arthritis" of the right knee. Dry eyes, dry mouth, denies dry skin and genitals - on exam pt has dry eyes, dry tongue and mild enlarged bilateral parotid glands nontender - consider Sjogren's Syndrome and evaluate for sarcoidosis, hepatitis C, IgG 4 related disease and other related diseases. On exam pt has tender bilateral trapezius areas, bilateral bicipital tendonitis, bilateral subacromial buritis, bilateral anserine bursitis, mild tenderness left PIPs contributing to her joint pain. Several years ago bone densitometry results revealed osteopenia. Treated vitamin 50,000 units q.weekly and calcium q.weekly ?dose. Recent bone densitometry results revealed osteoporosis requiring treatment -- with extensive discussion. Recent xrays results revealed osteoarthritis in the right knee -- with extensive discussion. Denies any undercooked pork or raw beef consumption. Mentions recent sleep disturbance and fatigue with snoring and uses CPAP machine with relief consistent with fibromyalgia. Morning stiffness lasting for 15 minutes. No previous history fractures. Menopause at age of 39 with no hormone replacement therapy. Denies family history of arthritis. I will evaluate for various types of rheumatic diseases  Plan: I reviewed patients chart and previous records with extensive discussion X-rays results reviewed with the patient with extensive discussion I reviewed recent Bone densitometry results with patient including my analysis of raw data with extensive discussion Laboratory tests ordered today - see list below - with coordination of care X-rays ordered - see list below - with coordination of care MRI bilateral parotid glands (no contraindication)--with coordination of care Diagnosis and prognosis discussed Continue current medications (other than those changed below) Meloxicam 15 mg q.d. at end of breakfast (Possible side effects explained including cardiovascular risk/MI/CVA) Prophylactic aspirin 81 mg q.d. end of supper (Possible side effects explained) Omeprazole 20 mg q.d. a.c. breakfast (possible side effects explained) Cyclobenzaprine 10 mg one half tab q.d. at supper time; if no better/side effects 7 days, increase to 10 mg q.d. (possible side effects explained) Vitamin D 50,000 units once a week (possible side effects explained) Calcium 500 mg t.i.d. at the end of meals or 600 mg b.i.d end of meals (Possible side effects explained) Consider Tymlos 80 ug subcutaneous q.d. x2 years (possible side effects explained including bone cancer in lab animals) If the pt does not want Tylmos, consider Prolia 60 mg SQ q.6 months (Possible side effects explained including AVN of jaw) If the pt does not want Prolia, consider oral bisphosphonates (possible side effects explained including AVN of the jaw) Artificial tears one drop each eye q.i.d. and p.r.n.(Possible side effects explained) Biotene mouthwash/spray q.i.d. and p.r.n.(Possible side effects explained) Oral Hydration Patient declines oral medication for dryness Daily exercise starting at 10 minutes per day, gradually increasing to at least 30 minutes per day--emphasized I request consult report from Orthopedics -- emphasized -- with coordination of care Return visit 2-3 weeks All questions and concerns were addressed

## 2024-07-16 ENCOUNTER — APPOINTMENT (OUTPATIENT)
Dept: RHEUMATOLOGY | Facility: CLINIC | Age: 69
End: 2024-07-16

## 2024-08-06 ENCOUNTER — APPOINTMENT (OUTPATIENT)
Dept: RHEUMATOLOGY | Facility: CLINIC | Age: 69
End: 2024-08-06

## 2024-08-06 PROCEDURE — 99215 OFFICE O/P EST HI 40 MIN: CPT

## 2024-08-06 PROCEDURE — G2212 PROLONG OUTPT/OFFICE VIS: CPT

## 2024-08-06 PROCEDURE — G2211 COMPLEX E/M VISIT ADD ON: CPT

## 2024-08-06 NOTE — PHYSICAL EXAM
[General Appearance - Alert] : alert [General Appearance - In No Acute Distress] : in no acute distress [General Appearance - Well Nourished] : well nourished [General Appearance - Well Developed] : well developed [General Appearance - Well-Appearing] : healthy appearing [Sclera] : the sclera and conjunctiva were normal [PERRL With Normal Accommodation] : pupils were equal in size, round, and reactive to light [Extraocular Movements] : extraocular movements were intact [Outer Ear] : the ears and nose were normal in appearance [Oropharynx] : the oropharynx was normal [Neck Appearance] : the appearance of the neck was normal [Neck Cervical Mass (___cm)] : no neck mass was observed [Jugular Venous Distention Increased] : there was no jugular-venous distention [Thyroid Diffuse Enlargement] : the thyroid was not enlarged [Lungs Percussion] : the lungs were normal to percussion [Heart Rate And Rhythm] : heart rate was normal and rhythm regular [Edema] : there was no peripheral edema [Abdomen Soft] : soft [Abdomen Tenderness] : non-tender [Abdomen Mass (___ Cm)] : no abdominal mass palpated [Cervical Lymph Nodes Enlarged Posterior Bilaterally] : posterior cervical [Cervical Lymph Nodes Enlarged Anterior Bilaterally] : anterior cervical [Supraclavicular Lymph Nodes Enlarged Bilaterally] : supraclavicular [Axillary Lymph Nodes Enlarged Bilaterally] : axillary [No CVA Tenderness] : no ~M costovertebral angle tenderness [No Spinal Tenderness] : no spinal tenderness [Skin Color & Pigmentation] : normal skin color and pigmentation [Skin Turgor] : normal skin turgor [] : no rash [Cranial Nerves] : cranial nerves 2-12 were intact [Sensation] : the sensory exam was normal to light touch and pinprick [Motor Exam] : the motor exam was normal [No Focal Deficits] : no focal deficits [Oriented To Time, Place, And Person] : oriented to person, place, and time [Impaired Insight] : insight and judgment were intact [Affect] : the affect was normal [Mood] : the mood was normal

## 2024-08-10 PROBLEM — Z86.69 HISTORY OF GLAUCOMA: Status: RESOLVED | Noted: 2024-08-10 | Resolved: 2024-08-10

## 2024-08-10 PROBLEM — Z87.898 HISTORY OF PERIPHERAL EDEMA: Status: RESOLVED | Noted: 2024-08-10 | Resolved: 2024-08-10

## 2024-08-10 PROBLEM — Z60.2 LIVES ALONE: Status: ACTIVE | Noted: 2024-08-10

## 2024-08-10 PROBLEM — Z80.0 FAMILY HISTORY OF COLON CANCER: Status: ACTIVE | Noted: 2024-08-10

## 2024-08-10 PROBLEM — M25.561 CHRONIC PAIN OF BOTH KNEES: Status: ACTIVE | Noted: 2024-08-10

## 2024-08-10 PROBLEM — Z79.1 ENCOUNTER FOR LONG-TERM (CURRENT) USE OF NSAIDS: Status: ACTIVE | Noted: 2024-08-10

## 2024-08-10 PROBLEM — M35.01 SJOGREN'S SYNDROME WITH KERATOCONJUNCTIVITIS SICCA: Status: ACTIVE | Noted: 2024-08-10

## 2024-08-10 PROBLEM — Z80.0 FAMILY HISTORY OF LIVER CANCER: Status: ACTIVE | Noted: 2024-08-10

## 2024-08-10 PROBLEM — R76.0 ANTICARDIOLIPIN ANTIBODY POSITIVE: Status: ACTIVE | Noted: 2024-08-10

## 2024-08-10 PROBLEM — Z78.0 HISTORY OF MENOPAUSE: Status: RESOLVED | Noted: 2018-10-14 | Resolved: 2024-08-10

## 2024-08-10 PROBLEM — Z86.59 HISTORY OF DEPRESSION: Status: RESOLVED | Noted: 2024-08-10 | Resolved: 2024-08-10

## 2024-08-10 PROBLEM — R06.02 SOB (SHORTNESS OF BREATH) ON EXERTION: Status: RESOLVED | Noted: 2023-11-01 | Resolved: 2024-08-10

## 2024-08-10 PROBLEM — Z83.438 FAMILY HISTORY OF HYPERLIPIDEMIA: Status: ACTIVE | Noted: 2024-08-10

## 2024-08-10 PROBLEM — M54.50 CHRONIC BILATERAL LOW BACK PAIN WITHOUT SCIATICA: Status: ACTIVE | Noted: 2018-09-21

## 2024-08-10 NOTE — REVIEW OF SYSTEMS
[Feeling Tired] : feeling tired [Dry Eyes] : dryness of the eyes [Arthralgias] : arthralgias [Joint Pain] : joint pain [Negative] : Heme/Lymph [As Noted in HPI] : as noted in HPI

## 2024-08-22 ENCOUNTER — OFFICE (OUTPATIENT)
Dept: URBAN - METROPOLITAN AREA CLINIC 112 | Facility: CLINIC | Age: 69
Setting detail: OPHTHALMOLOGY
End: 2024-08-22
Payer: COMMERCIAL

## 2024-08-22 DIAGNOSIS — H52.4: ICD-10-CM

## 2024-08-22 DIAGNOSIS — H01.002: ICD-10-CM

## 2024-08-22 DIAGNOSIS — H40.31X2: ICD-10-CM

## 2024-08-22 DIAGNOSIS — H35.033: ICD-10-CM

## 2024-08-22 DIAGNOSIS — H01.005: ICD-10-CM

## 2024-08-22 DIAGNOSIS — H16.223: ICD-10-CM

## 2024-08-22 PROBLEM — H40.052 OCULAR HYPERTENSION; LEFT EYE: Status: ACTIVE | Noted: 2024-08-22

## 2024-08-22 PROCEDURE — 92015 DETERMINE REFRACTIVE STATE: CPT | Performed by: OPHTHALMOLOGY

## 2024-08-22 PROCEDURE — 83861 MICROFLUID ANALY TEARS: CPT | Performed by: OPHTHALMOLOGY

## 2024-08-22 PROCEDURE — 99214 OFFICE O/P EST MOD 30 MIN: CPT | Performed by: OPHTHALMOLOGY

## 2024-08-22 PROCEDURE — 92250 FUNDUS PHOTOGRAPHY W/I&R: CPT | Performed by: OPHTHALMOLOGY

## 2024-08-22 ASSESSMENT — LID EXAM ASSESSMENTS
OS_BLEPHARITIS: LLL 1+
OD_BLEPHARITIS: RLL 1+

## 2024-08-22 ASSESSMENT — CONFRONTATIONAL VISUAL FIELD TEST (CVF)
OS_FINDINGS: FULL
OD_FINDINGS: FULL

## 2024-08-23 ENCOUNTER — APPOINTMENT (OUTPATIENT)
Dept: RHEUMATOLOGY | Facility: CLINIC | Age: 69
End: 2024-08-23

## 2024-08-26 ENCOUNTER — OUTPATIENT (OUTPATIENT)
Dept: OUTPATIENT SERVICES | Facility: HOSPITAL | Age: 69
LOS: 1 days | End: 2024-08-26

## 2024-08-26 ENCOUNTER — NON-APPOINTMENT (OUTPATIENT)
Age: 69
End: 2024-08-26

## 2024-08-26 ENCOUNTER — APPOINTMENT (OUTPATIENT)
Dept: INTERNAL MEDICINE | Facility: CLINIC | Age: 69
End: 2024-08-26

## 2024-08-30 ENCOUNTER — APPOINTMENT (OUTPATIENT)
Dept: RHEUMATOLOGY | Facility: CLINIC | Age: 69
End: 2024-08-30

## 2024-08-30 VITALS
SYSTOLIC BLOOD PRESSURE: 112 MMHG | OXYGEN SATURATION: 97 % | DIASTOLIC BLOOD PRESSURE: 75 MMHG | TEMPERATURE: 98 F | HEART RATE: 56 BPM | RESPIRATION RATE: 16 BRPM

## 2024-09-06 ENCOUNTER — NON-APPOINTMENT (OUTPATIENT)
Age: 69
End: 2024-09-06

## 2024-09-10 ENCOUNTER — APPOINTMENT (OUTPATIENT)
Dept: PULMONOLOGY | Facility: CLINIC | Age: 69
End: 2024-09-10
Payer: MEDICARE

## 2024-09-10 VITALS
DIASTOLIC BLOOD PRESSURE: 70 MMHG | WEIGHT: 218 LBS | BODY MASS INDEX: 47.03 KG/M2 | OXYGEN SATURATION: 98 % | SYSTOLIC BLOOD PRESSURE: 132 MMHG | RESPIRATION RATE: 16 BRPM | HEIGHT: 57 IN | HEART RATE: 68 BPM

## 2024-09-10 DIAGNOSIS — R05.3 CHRONIC COUGH: ICD-10-CM

## 2024-09-10 DIAGNOSIS — G47.33 OBSTRUCTIVE SLEEP APNEA (ADULT) (PEDIATRIC): ICD-10-CM

## 2024-09-10 DIAGNOSIS — K21.9 GASTRO-ESOPHAGEAL REFLUX DISEASE W/OUT ESOPHAGITIS: ICD-10-CM

## 2024-09-10 DIAGNOSIS — R06.02 SHORTNESS OF BREATH: ICD-10-CM

## 2024-09-10 PROCEDURE — G2211 COMPLEX E/M VISIT ADD ON: CPT

## 2024-09-10 PROCEDURE — 99215 OFFICE O/P EST HI 40 MIN: CPT

## 2024-09-10 NOTE — END OF VISIT
[Time Spent: ___ minutes] : I have spent [unfilled] minutes of time on the encounter which excludes teaching and separately reported services. [FreeTextEntry3] : CT reviewed on PACS

## 2024-09-10 NOTE — REASON FOR VISIT
[Initial] : an initial visit [Sleep Evaluation] : sleep evaluation [Ad Hoc ] : provided by an ad hoc  [Interpreters_FullName] : Opal [Interpreters_Relationshiptopatient] : daughter [TWNoteComboBox1] : [place]

## 2024-09-10 NOTE — CONSULT LETTER
[Dear  ___] : Dear  [unfilled], [Consult Letter:] : I had the pleasure of evaluating your patient, [unfilled]. [Please see my note below.] : Please see my note below. [Consult Closing:] : Thank you very much for allowing me to participate in the care of this patient.  If you have any questions, please do not hesitate to contact me. [Sincerely,] : Sincerely, [FreeTextEntry3] : Pepe Ureña MD FCCP Pulmonary/Critical Care/Sleep Medicine Department of Internal Medicine  [facility]

## 2024-09-10 NOTE — HISTORY OF PRESENT ILLNESS
[Never] : never [Obstructive Sleep Apnea] : obstructive sleep apnea [Awakes Unrefreshed] : awakes unrefreshed [Awakes with Dry Mouth] : awakes with dry mouth [Awakes with Headache] : awakes with headache [Daytime Somnolence] : daytime somnolence [Difficulty Initiating Sleep] : difficulty initiating sleep [Snoring] : snoring [Witnessed Apneas] : witnessed apneas [Full Face mask] : full face mask [TextBox_4] : 69-year-old female with history of morbid obesity seen today for follow-up of sleep reevaluation.  Patient is pulmonary workup which included pulmonary function test as well as CAT scan D-dimer etc. are reviewed below.  No evidence of intrinsic heart disease on echocardiogram.  History is positive for reflux but the patient has been noncompliant with her omeprazole.  She also remains noncompliant with her present CPAP.  Her exercise is limited by her obesity [TextBox_77] : 2012 [TextBox_79] : 5570 [TextBox_81] : 10 [TextBox_89] : 2 [TextBox_127] : 8/11/2024 [TextBox_93] : above off CPAP [TextBox_129] : 9/9/2024 [TextBox_133] : 23 [TextBox_137] : 3 [TextBox_141] : 3 [TextBox_143] : 4 [TextBox_147] : 0.3 [TextBox_158] : [place] [TextBox_165] : 9 yrs hx  ARIANNA Has ARIANNA, Sleep study Shaq [TextBox_162] : 9/2022 [ESS] : 14

## 2024-09-10 NOTE — DISCUSSION/SUMMARY
[Obstructive Sleep Apnea] : obstructive sleep apnea [Responding to Treatment] : responding to treatment [Sleep Study] : sleep study [Alcohol Avoidance] : alcohol avoidance [Sedative Avoidance] : sedative avoidance [Weight Loss Program] : weight loss program [de-identified] : HST [de-identified] : Patient was advised to follow-up with her CPAP machine for interrogation [FreeTextEntry1] : Patient is complains of dyspnea most likely on the basis of obesity deconditioning with chest wall restriction.  Recent complaints of cough appear to be a complication of her reflux and untreated obstructive sleep apnea.  Patient has been advised to restart her omeprazole as well as increase her compliance with her present CPAP which was interrogated today

## 2024-09-10 NOTE — PROCEDURE
[FreeTextEntry1] : 3/20/2024: Spirometry demonstrates a mild degree of restriction versus low normal values.  No evidence of airway obstruction

## 2024-09-10 NOTE — PHYSICAL EXAM
[No Acute Distress] : no acute distress [Normal Oropharynx] : normal oropharynx [IV] : Mallampati Class: IV [Normal Appearance] : normal appearance [Neck Circumference: ___] : neck circumference: [unfilled] [No Neck Mass] : no neck mass [Normal Rate/Rhythm] : normal rate/rhythm [Normal S1, S2] : normal s1, s2 [No Murmurs] : no murmurs [No Resp Distress] : no resp distress [Clear to Auscultation Bilaterally] : clear to auscultation bilaterally [No Abnormalities] : no abnormalities [Benign] : benign [Normal Gait] : normal gait [No Clubbing] : no clubbing [No Cyanosis] : no cyanosis [FROM] : FROM [1+ Pitting] : 1+ pitting [Normal Color/ Pigmentation] : normal color/ pigmentation [No Focal Deficits] : no focal deficits [Oriented x3] : oriented x3 [Normal Affect] : normal affect [TextBox_2] : morbidly obese

## 2024-09-19 ENCOUNTER — OUTPATIENT (OUTPATIENT)
Dept: OUTPATIENT SERVICES | Facility: HOSPITAL | Age: 69
LOS: 1 days | End: 2024-09-19

## 2024-09-19 DIAGNOSIS — G47.33 OBSTRUCTIVE SLEEP APNEA (ADULT) (PEDIATRIC): ICD-10-CM

## 2024-09-19 PROCEDURE — 95800 SLP STDY UNATTENDED: CPT | Mod: 26

## 2024-09-19 PROCEDURE — 95800 SLP STDY UNATTENDED: CPT

## 2024-10-10 ENCOUNTER — APPOINTMENT (OUTPATIENT)
Dept: RADIOLOGY | Facility: CLINIC | Age: 69
End: 2024-10-10

## 2024-10-10 ENCOUNTER — OUTPATIENT (OUTPATIENT)
Dept: OUTPATIENT SERVICES | Facility: HOSPITAL | Age: 69
LOS: 1 days | End: 2024-10-10
Payer: MEDICAID

## 2024-10-10 ENCOUNTER — APPOINTMENT (OUTPATIENT)
Dept: MRI IMAGING | Facility: CLINIC | Age: 69
End: 2024-10-10

## 2024-10-10 DIAGNOSIS — Z00.8 ENCOUNTER FOR OTHER GENERAL EXAMINATION: ICD-10-CM

## 2024-10-10 PROCEDURE — 72070 X-RAY EXAM THORAC SPINE 2VWS: CPT | Mod: 26

## 2024-10-10 PROCEDURE — 72110 X-RAY EXAM L-2 SPINE 4/>VWS: CPT | Mod: 26

## 2024-10-10 PROCEDURE — 73030 X-RAY EXAM OF SHOULDER: CPT | Mod: 26,50

## 2024-10-10 PROCEDURE — 70543 MRI ORBT/FAC/NCK W/O &W/DYE: CPT | Mod: 26

## 2024-10-10 PROCEDURE — 72050 X-RAY EXAM NECK SPINE 4/5VWS: CPT | Mod: 26

## 2024-10-10 PROCEDURE — 72202 X-RAY EXAM SI JOINTS 3/> VWS: CPT | Mod: 26

## 2024-10-16 ENCOUNTER — APPOINTMENT (OUTPATIENT)
Dept: CARDIOLOGY | Facility: CLINIC | Age: 69
End: 2024-10-16

## 2024-10-22 ENCOUNTER — APPOINTMENT (OUTPATIENT)
Dept: PULMONOLOGY | Facility: CLINIC | Age: 69
End: 2024-10-22

## 2024-10-31 ENCOUNTER — APPOINTMENT (OUTPATIENT)
Dept: RHEUMATOLOGY | Facility: CLINIC | Age: 69
End: 2024-10-31
Payer: MEDICARE

## 2024-10-31 ENCOUNTER — LABORATORY RESULT (OUTPATIENT)
Age: 69
End: 2024-10-31

## 2024-10-31 VITALS
DIASTOLIC BLOOD PRESSURE: 80 MMHG | WEIGHT: 218 LBS | BODY MASS INDEX: 47.03 KG/M2 | HEART RATE: 64 BPM | OXYGEN SATURATION: 98 % | TEMPERATURE: 97.8 F | HEIGHT: 57 IN | SYSTOLIC BLOOD PRESSURE: 130 MMHG

## 2024-10-31 DIAGNOSIS — Z79.1 LONG TERM (CURRENT) USE OF NON-STEROIDAL ANTI-INFLAMMATORIES (NSAID): ICD-10-CM

## 2024-10-31 DIAGNOSIS — M15.9 POLYOSTEOARTHRITIS, UNSPECIFIED: ICD-10-CM

## 2024-10-31 DIAGNOSIS — M81.0 AGE-RELATED OSTEOPOROSIS W/OUT CURRENT PATHOLOGICAL FRACTURE: ICD-10-CM

## 2024-10-31 DIAGNOSIS — H04.123 DRY EYE SYNDROME OF BILATERAL LACRIMAL GLANDS: ICD-10-CM

## 2024-10-31 DIAGNOSIS — G89.29 PAIN IN RIGHT KNEE: ICD-10-CM

## 2024-10-31 DIAGNOSIS — M25.562 PAIN IN RIGHT KNEE: ICD-10-CM

## 2024-10-31 DIAGNOSIS — M35.01 SICCA SYNDROME WITH KERATOCONJUNCTIVITIS: ICD-10-CM

## 2024-10-31 DIAGNOSIS — M25.561 PAIN IN RIGHT KNEE: ICD-10-CM

## 2024-10-31 DIAGNOSIS — K11.1 HYPERTROPHY OF SALIVARY GLAND: ICD-10-CM

## 2024-10-31 DIAGNOSIS — R76.0 RAISED ANTIBODY TITER: ICD-10-CM

## 2024-10-31 DIAGNOSIS — R68.2 DRY MOUTH, UNSPECIFIED: ICD-10-CM

## 2024-10-31 DIAGNOSIS — M79.7 FIBROMYALGIA: ICD-10-CM

## 2024-10-31 PROCEDURE — G2212 PROLONG OUTPT/OFFICE VIS: CPT

## 2024-10-31 PROCEDURE — 36415 COLL VENOUS BLD VENIPUNCTURE: CPT

## 2024-10-31 PROCEDURE — 99215 OFFICE O/P EST HI 40 MIN: CPT

## 2024-10-31 PROCEDURE — G2211 COMPLEX E/M VISIT ADD ON: CPT

## 2024-11-01 RX ORDER — ETODOLAC 400 MG/1
400 TABLET, FILM COATED, EXTENDED RELEASE ORAL
Qty: 90 | Refills: 0 | Status: ACTIVE | COMMUNITY
Start: 1900-01-01 | End: 1900-01-01

## 2024-11-01 RX ORDER — ESCITALOPRAM OXALATE 10 MG/1
10 TABLET, FILM COATED ORAL
Refills: 0 | Status: ACTIVE | COMMUNITY

## 2024-11-10 LAB
ALBUMIN SERPL ELPH-MCNC: 4.5 G/DL
ALP BLD-CCNC: 72 U/L
ALT SERPL-CCNC: 25 U/L
ANION GAP SERPL CALC-SCNC: 14 MMOL/L
APPEARANCE: CLEAR
AST SERPL-CCNC: 27 U/L
B2 GLYCOPROT1 IGA SERPL IA-ACNC: 6.8 U/ML
B2 GLYCOPROT1 IGG SER-ACNC: 5.3 U/ML
B2 GLYCOPROT1 IGM SER-ACNC: 12.5 U/ML
BACTERIA: NEGATIVE /HPF
BASOPHILS # BLD AUTO: 0.07 K/UL
BASOPHILS NFR BLD AUTO: 0.9 %
BILIRUB SERPL-MCNC: 0.5 MG/DL
BILIRUBIN URINE: NEGATIVE
BLOOD URINE: NEGATIVE
BUN SERPL-MCNC: 13 MG/DL
CALCIUM SERPL-MCNC: 9.6 MG/DL
CARDIOLIPIN IGM SER-MCNC: 18.2 MPL U/ML
CARDIOLIPIN IGM SER-MCNC: 3.6 GPL U/ML
CAST: 0 /LPF
CHLORIDE SERPL-SCNC: 102 MMOL/L
CK SERPL-CCNC: 109 U/L
CO2 SERPL-SCNC: 24 MMOL/L
COLOR: NORMAL
CORTIS SERPL-MCNC: 7 UG/DL
CREAT SERPL-MCNC: 0.71 MG/DL
CRP SERPL-MCNC: 10 MG/L
DEPRECATED CARDIOLIPIN IGA SER: 6.8 APL U/ML
EGFR: 91 ML/MIN/1.73M2
ENA SS-A AB SER IA-ACNC: <0.2 AL
ENA SS-B AB SER IA-ACNC: <0.2 AL
EOSINOPHIL # BLD AUTO: 0.22 K/UL
EOSINOPHIL NFR BLD AUTO: 2.7 %
EPITHELIAL CELLS: 2 /HPF
ERYTHROCYTE [SEDIMENTATION RATE] IN BLOOD BY WESTERGREN METHOD: 41 MM/HR
FOLATE SERPL-MCNC: >20 NG/ML
GLUCOSE QUALITATIVE U: NEGATIVE MG/DL
GLUCOSE SERPL-MCNC: 83 MG/DL
HCT VFR BLD CALC: 40.6 %
HGB BLD-MCNC: 12.6 G/DL
IMM GRANULOCYTES NFR BLD AUTO: 0.4 %
KETONES URINE: NEGATIVE MG/DL
LDH SERPL-CCNC: 192 U/L
LEUKOCYTE ESTERASE URINE: ABNORMAL
LYMPHOCYTES # BLD AUTO: 2.29 K/UL
LYMPHOCYTES NFR BLD AUTO: 28.6 %
MAGNESIUM SERPL-MCNC: 2.1 MG/DL
MAN DIFF?: NORMAL
MCHC RBC-ENTMCNC: 28.2 PG
MCHC RBC-ENTMCNC: 31 G/DL
MCV RBC AUTO: 90.8 FL
MICROSCOPIC-UA: NORMAL
MONOCYTES # BLD AUTO: 0.78 K/UL
MONOCYTES NFR BLD AUTO: 9.7 %
NEUTROPHILS # BLD AUTO: 4.63 K/UL
NEUTROPHILS NFR BLD AUTO: 57.7 %
NITRITE URINE: NEGATIVE
PH URINE: 6
PHOSPHATE SERPL-MCNC: 3.2 MG/DL
PLATELET # BLD AUTO: 267 K/UL
POTASSIUM SERPL-SCNC: 3.9 MMOL/L
PROT SERPL-MCNC: 7.7 G/DL
PROTEIN URINE: NEGATIVE MG/DL
RBC # BLD: 4.47 M/UL
RBC # FLD: 13.7 %
RED BLOOD CELLS URINE: 1 /HPF
SODIUM SERPL-SCNC: 139 MMOL/L
SPECIFIC GRAVITY URINE: 1.01
T PALLIDUM AB SER QL IA: NEGATIVE
UROBILINOGEN URINE: 0.2 MG/DL
VIT B12 SERPL-MCNC: 1073 PG/ML
WBC # FLD AUTO: 8.02 K/UL
WHITE BLOOD CELLS URINE: 4 /HPF

## 2024-11-15 ENCOUNTER — APPOINTMENT (OUTPATIENT)
Dept: CARDIOLOGY | Facility: CLINIC | Age: 69
End: 2024-11-15
Payer: MEDICARE

## 2024-11-15 ENCOUNTER — NON-APPOINTMENT (OUTPATIENT)
Age: 69
End: 2024-11-15

## 2024-11-15 VITALS
BODY MASS INDEX: 49.76 KG/M2 | OXYGEN SATURATION: 99 % | DIASTOLIC BLOOD PRESSURE: 68 MMHG | SYSTOLIC BLOOD PRESSURE: 118 MMHG | WEIGHT: 215 LBS | HEART RATE: 70 BPM | HEIGHT: 55 IN

## 2024-11-15 DIAGNOSIS — E78.5 HYPERLIPIDEMIA, UNSPECIFIED: ICD-10-CM

## 2024-11-15 DIAGNOSIS — R06.02 SHORTNESS OF BREATH: ICD-10-CM

## 2024-11-15 DIAGNOSIS — I10 ESSENTIAL (PRIMARY) HYPERTENSION: ICD-10-CM

## 2024-11-15 PROCEDURE — 93000 ELECTROCARDIOGRAM COMPLETE: CPT

## 2024-11-15 PROCEDURE — 99214 OFFICE O/P EST MOD 30 MIN: CPT

## 2024-11-20 PROCEDURE — 72070 X-RAY EXAM THORAC SPINE 2VWS: CPT

## 2024-11-20 PROCEDURE — A9585: CPT

## 2024-11-20 PROCEDURE — 73030 X-RAY EXAM OF SHOULDER: CPT

## 2024-11-20 PROCEDURE — 72202 X-RAY EXAM SI JOINTS 3/> VWS: CPT

## 2024-11-20 PROCEDURE — 72050 X-RAY EXAM NECK SPINE 4/5VWS: CPT

## 2024-11-20 PROCEDURE — 72110 X-RAY EXAM L-2 SPINE 4/>VWS: CPT

## 2024-11-20 PROCEDURE — 70543 MRI ORBT/FAC/NCK W/O &W/DYE: CPT

## 2024-12-12 ENCOUNTER — OFFICE (OUTPATIENT)
Dept: URBAN - METROPOLITAN AREA CLINIC 112 | Facility: CLINIC | Age: 69
Setting detail: OPHTHALMOLOGY
End: 2024-12-12
Payer: COMMERCIAL

## 2024-12-12 DIAGNOSIS — H01.005: ICD-10-CM

## 2024-12-12 DIAGNOSIS — H35.033: ICD-10-CM

## 2024-12-12 DIAGNOSIS — H40.31X2: ICD-10-CM

## 2024-12-12 DIAGNOSIS — H01.002: ICD-10-CM

## 2024-12-12 PROCEDURE — 92083 EXTENDED VISUAL FIELD XM: CPT | Performed by: OPHTHALMOLOGY

## 2024-12-12 PROCEDURE — 92014 COMPRE OPH EXAM EST PT 1/>: CPT | Performed by: OPHTHALMOLOGY

## 2024-12-12 PROCEDURE — 92133 CPTRZD OPH DX IMG PST SGM ON: CPT | Performed by: OPHTHALMOLOGY

## 2024-12-12 ASSESSMENT — LID EXAM ASSESSMENTS
OS_BLEPHARITIS: LLL 1+
OD_BLEPHARITIS: RLL 1+

## 2024-12-12 ASSESSMENT — PACHYMETRY
OD_CT_CORRECTION: 2
OD_CT_UM: 516
OS_CT_CORRECTION: 1
OS_CT_UM: 523

## 2024-12-12 ASSESSMENT — SUPERFICIAL PUNCTATE KERATITIS (SPK)
OS_SPK: T
OD_SPK: T

## 2024-12-12 ASSESSMENT — CONFRONTATIONAL VISUAL FIELD TEST (CVF)
OS_FINDINGS: FULL
OD_FINDINGS: FULL

## 2024-12-13 ASSESSMENT — REFRACTION_CURRENTRX
OD_OVR_VA: 20/
OD_SPHERE: -1.50
OD_ADD: +2.50
OS_AXIS: 079
OD_CYLINDER: -2.25
OS_ADD: +3.00
OD_AXIS: 102
OS_CYLINDER: -1.25
OS_OVR_VA: 20/
OS_SPHERE: +0.50

## 2024-12-13 ASSESSMENT — KERATOMETRY
OD_AXISANGLE_DEGREES: 011
METHOD_AUTO_MANUAL: AUTO
OS_K1POWER_DIOPTERS: 42.75
OD_K2POWER_DIOPTERS: 45.00
OS_AXISANGLE_DEGREES: 161
OD_K1POWER_DIOPTERS: 42.50
OS_K2POWER_DIOPTERS: 43.75

## 2024-12-13 ASSESSMENT — REFRACTION_MANIFEST
OD_VA1: 20/25-2
OD_AXIS: 105
OD_VA1: 20/40-2
OD_ADD: +2.75
OD_SPHERE: -1.50
OD_CYLINDER: -2.75
OS_SPHERE: +0.75
OS_AXIS: 080
OS_CYLINDER: -1.25
OS_CYLINDER: -0.50
OS_AXIS: 085
OS_SPHERE: +0.50
OS_VA1: 20/25+2
OD_SPHERE: -1.00
OU_VA: 20/25
OD_CYLINDER: -2.75
OD_AXIS: 100
OS_ADD: +2.75
OS_VA1: 20/20-1

## 2024-12-13 ASSESSMENT — REFRACTION_AUTOREFRACTION
OS_AXIS: 076
OD_AXIS: 103
OS_CYLINDER: -1.25
OD_SPHERE: -1.25
OD_CYLINDER: -3.25
OS_SPHERE: +0.75

## 2024-12-13 ASSESSMENT — VISUAL ACUITY
OS_BCVA: 20/60-
OD_BCVA: 20/25

## 2024-12-23 ENCOUNTER — ASC (OUTPATIENT)
Dept: URBAN - METROPOLITAN AREA SURGERY 8 | Facility: SURGERY | Age: 69
Setting detail: OPHTHALMOLOGY
End: 2024-12-23
Payer: COMMERCIAL

## 2024-12-23 DIAGNOSIS — H40.31X2: ICD-10-CM

## 2024-12-23 PROCEDURE — 65855 TRABECULOPLASTY LASER SURG: CPT | Mod: RT | Performed by: OPHTHALMOLOGY

## 2024-12-24 PROBLEM — H40.052 OCULAR HYPERTENSION; LEFT EYE: Status: ACTIVE | Noted: 2024-12-23

## 2024-12-24 ASSESSMENT — REFRACTION_MANIFEST
OS_SPHERE: +0.50
OD_AXIS: 105
OS_CYLINDER: -1.25
OD_CYLINDER: -2.75
OS_SPHERE: +0.75
OS_AXIS: 080
OD_ADD: +2.75
OD_SPHERE: -1.00
OS_ADD: +2.75
OD_VA1: 20/40-2
OD_SPHERE: -1.50
OD_CYLINDER: -2.75
OD_AXIS: 100
OU_VA: 20/25
OS_VA1: 20/20-1
OD_VA1: 20/25-2
OS_AXIS: 085
OS_CYLINDER: -0.50
OS_VA1: 20/25+2

## 2024-12-24 ASSESSMENT — VISUAL ACUITY
OD_BCVA: 20/25
OS_BCVA: 20/60-

## 2024-12-24 ASSESSMENT — REFRACTION_CURRENTRX
OS_AXIS: 079
OD_ADD: +2.50
OS_ADD: +3.00
OD_SPHERE: -1.50
OS_OVR_VA: 20/
OD_AXIS: 102
OS_CYLINDER: -1.25
OS_SPHERE: +0.50
OD_OVR_VA: 20/
OD_CYLINDER: -2.25

## 2024-12-24 ASSESSMENT — KERATOMETRY
OS_K1POWER_DIOPTERS: 42.75
OS_K2POWER_DIOPTERS: 43.75
METHOD_AUTO_MANUAL: AUTO
OD_K1POWER_DIOPTERS: 42.50
OS_AXISANGLE_DEGREES: 161
OD_AXISANGLE_DEGREES: 011
OD_K2POWER_DIOPTERS: 45.00

## 2024-12-24 ASSESSMENT — REFRACTION_AUTOREFRACTION
OS_SPHERE: +0.75
OD_CYLINDER: -3.25
OS_CYLINDER: -1.25
OD_SPHERE: -1.25
OS_AXIS: 076
OD_AXIS: 103

## 2025-01-16 ENCOUNTER — ASC (OUTPATIENT)
Dept: URBAN - METROPOLITAN AREA SURGERY 8 | Facility: SURGERY | Age: 70
Setting detail: OPHTHALMOLOGY
End: 2025-01-16
Payer: MEDICARE

## 2025-01-16 DIAGNOSIS — H40.32X2: ICD-10-CM

## 2025-01-16 PROBLEM — H40.052 OCULAR HYPERTENSION; LEFT EYE: Status: ACTIVE | Noted: 2025-01-16

## 2025-01-16 PROCEDURE — 65855 TRABECULOPLASTY LASER SURG: CPT | Mod: LT | Performed by: OPHTHALMOLOGY

## 2025-01-16 ASSESSMENT — REFRACTION_CURRENTRX
OD_SPHERE: -1.50
OS_SPHERE: +0.50
OD_CYLINDER: -2.25
OD_AXIS: 102
OS_ADD: +3.00
OS_CYLINDER: -1.25
OS_OVR_VA: 20/
OD_ADD: +2.50
OD_OVR_VA: 20/
OS_AXIS: 079

## 2025-01-16 ASSESSMENT — REFRACTION_MANIFEST
OD_ADD: +2.75
OS_CYLINDER: -0.50
OD_VA1: 20/40-2
OD_AXIS: 105
OD_SPHERE: -1.00
OD_VA1: 20/25-2
OS_AXIS: 080
OU_VA: 20/25
OD_AXIS: 100
OS_SPHERE: +0.75
OS_ADD: +2.75
OD_SPHERE: -1.50
OD_CYLINDER: -2.75
OS_SPHERE: +0.50
OD_CYLINDER: -2.75
OS_VA1: 20/20-1
OS_AXIS: 085
OS_CYLINDER: -1.25
OS_VA1: 20/25+2

## 2025-01-16 ASSESSMENT — KERATOMETRY
METHOD_AUTO_MANUAL: AUTO
OD_K1POWER_DIOPTERS: 42.50
OS_K1POWER_DIOPTERS: 42.75
OD_AXISANGLE_DEGREES: 011
OD_K2POWER_DIOPTERS: 45.00
OS_K2POWER_DIOPTERS: 43.75
OS_AXISANGLE_DEGREES: 161

## 2025-01-16 ASSESSMENT — REFRACTION_AUTOREFRACTION
OD_AXIS: 103
OS_SPHERE: +0.75
OD_SPHERE: -1.25
OS_CYLINDER: -1.25
OS_AXIS: 076
OD_CYLINDER: -3.25

## 2025-01-16 ASSESSMENT — VISUAL ACUITY
OS_BCVA: 20/60-
OD_BCVA: 20/25

## 2025-01-21 ENCOUNTER — APPOINTMENT (OUTPATIENT)
Dept: PULMONOLOGY | Facility: CLINIC | Age: 70
End: 2025-01-21

## 2025-02-03 NOTE — ED ADULT NURSE NOTE - PAIN RATING/NUMBER SCALE (0-10): ACTIVITY
[facility]    History of Present Illness:   Lesley Lauren is a 76 y.o. female with history of HTN, PE, DVT, DM, COPD, HLD, DCIS, CVA, leg pain   CC: Cellulitis  History provided by patient and Records    HPI:  Cellulitis/Abscess Initial:  Patient presents with complaint of infection starting 8 days ago.  Infection started secondary to laceration.  - Location:  Right lateral ankle    - Symptoms: Reports erythema and tenderness; Denies fever, chills, nausea, and vomiting.  - Pain: aching of 2 out of 10 severity.  - Fevers greater than 102?: no  - Streaking across skin?: No  - Enlarged Lymph Nodes?: No  - Currently taking immunocompromising drugs?: No  - Last use of Antibiotics: Not recent  - Current Treatments: none.     Health Maintenance  Health Maintenance Due   Topic Date Due    DTaP/Tdap/Td vaccine (1 - Tdap) 10/12/2011    Diabetic retinal exam  01/07/2021    Respiratory Syncytial Virus (RSV) Pregnant or age 60 yrs+ (1 - 1-dose 75+ series) Never done    Diabetic Alb to Cr ratio (uACR) test  11/11/2022    Flu vaccine (1) 08/01/2024    COVID-19 Vaccine (3 - 2023-24 season) 09/01/2024    A1C test (Diabetic or Prediabetic)  12/05/2024    Annual Wellness Visit (Medicare Advantage)  01/01/2025       Past Medical, Family, and Social History:     Current Outpatient Medications on File Prior to Visit   Medication Sig Dispense Refill    montelukast (SINGULAIR) 10 MG tablet Take 1 tablet by mouth nightly 90 tablet 1    glipiZIDE (GLUCOTROL) 5 MG tablet TAKE ONE TABLET BY MOUTH TWICE DAILY 180 tablet 1    carvedilol (COREG) 6.25 MG tablet TAKE ONE TABLET BY MOUTH TWICE DAILY 180 tablet 1    tamsulosin (FLOMAX) 0.4 MG capsule TAKE ONE CAPSULE BY MOUTH DAILY 90 capsule 1    lovastatin (MEVACOR) 10 MG tablet TAKE ONE TABLET BY MOUTH NIGHTLY 90 tablet 1    pregabalin (LYRICA) 50 MG capsule TAKE ONE CAPSULE BY MOUTH TWICE A DAY. MAX DAILY AMOUNT: 100 MG 60 capsule 5    meclizine (ANTIVERT) 25 MG 
\"Have you been to the ER, urgent care clinic since your last visit?  Hospitalized since your last visit?\"    NO    “Have you seen or consulted any other health care providers outside of [facility] since your last visit?”    NO            Click Here for Release of Records Request   
9

## 2025-02-06 NOTE — ASSESSMENT
59 [FreeTextEntry1] : Reviewed outside records. \par On CT scan Abdomen and Pelvis in Sept: no kidney stone. \par \par Urinary incontinence:\par Discussed Bladder Ultrasound. Good bladder capacity. No need for anti cholinergic or B3 agonist. \par Will get Urinalysis with reflex Urine culture. Will inform results. \par Recommended Uro-Gynecology for further management urinary incontinence.

## 2025-03-12 ENCOUNTER — APPOINTMENT (OUTPATIENT)
Dept: PULMONOLOGY | Facility: CLINIC | Age: 70
End: 2025-03-12
Payer: MEDICARE

## 2025-03-12 ENCOUNTER — APPOINTMENT (OUTPATIENT)
Dept: RADIOLOGY | Facility: CLINIC | Age: 70
End: 2025-03-12
Payer: MEDICARE

## 2025-03-12 ENCOUNTER — OUTPATIENT (OUTPATIENT)
Dept: OUTPATIENT SERVICES | Facility: HOSPITAL | Age: 70
LOS: 1 days | End: 2025-03-12
Payer: MEDICARE

## 2025-03-12 VITALS
OXYGEN SATURATION: 96 % | BODY MASS INDEX: 42.6 KG/M2 | DIASTOLIC BLOOD PRESSURE: 68 MMHG | SYSTOLIC BLOOD PRESSURE: 126 MMHG | HEART RATE: 67 BPM | RESPIRATION RATE: 16 BRPM | WEIGHT: 217 LBS | HEIGHT: 60 IN

## 2025-03-12 DIAGNOSIS — Z71.89 OTHER SPECIFIED COUNSELING: ICD-10-CM

## 2025-03-12 DIAGNOSIS — G47.33 OBSTRUCTIVE SLEEP APNEA (ADULT) (PEDIATRIC): ICD-10-CM

## 2025-03-12 DIAGNOSIS — R05.3 CHRONIC COUGH: ICD-10-CM

## 2025-03-12 PROCEDURE — 71046 X-RAY EXAM CHEST 2 VIEWS: CPT | Mod: 26

## 2025-03-12 PROCEDURE — 71046 X-RAY EXAM CHEST 2 VIEWS: CPT

## 2025-03-12 PROCEDURE — G2211 COMPLEX E/M VISIT ADD ON: CPT

## 2025-03-12 PROCEDURE — 99214 OFFICE O/P EST MOD 30 MIN: CPT

## 2025-03-12 RX ORDER — AZELASTINE HYDROCHLORIDE AND FLUTICASONE PROPIONATE 137; 50 UG/1; UG/1
137-50 SPRAY, METERED NASAL TWICE DAILY
Qty: 1 | Refills: 5 | Status: ACTIVE | COMMUNITY
Start: 2025-03-12 | End: 1900-01-01

## 2025-03-12 RX ORDER — MOMETASONE 50 UG/1
50 SPRAY, METERED NASAL
Qty: 1 | Refills: 5 | Status: ACTIVE | COMMUNITY
Start: 2025-03-12 | End: 1900-01-01

## 2025-03-13 ENCOUNTER — APPOINTMENT (OUTPATIENT)
Dept: RHEUMATOLOGY | Facility: CLINIC | Age: 70
End: 2025-03-13
Payer: MEDICARE

## 2025-03-13 ENCOUNTER — NON-APPOINTMENT (OUTPATIENT)
Age: 70
End: 2025-03-13

## 2025-03-13 VITALS
HEIGHT: 60 IN | HEART RATE: 79 BPM | OXYGEN SATURATION: 95 % | DIASTOLIC BLOOD PRESSURE: 60 MMHG | TEMPERATURE: 97.7 F | SYSTOLIC BLOOD PRESSURE: 128 MMHG

## 2025-03-13 DIAGNOSIS — R68.2 DRY MOUTH, UNSPECIFIED: ICD-10-CM

## 2025-03-13 DIAGNOSIS — M54.50 LOW BACK PAIN, UNSPECIFIED: ICD-10-CM

## 2025-03-13 DIAGNOSIS — Z79.1 LONG TERM (CURRENT) USE OF NON-STEROIDAL ANTI-INFLAMMATORIES (NSAID): ICD-10-CM

## 2025-03-13 DIAGNOSIS — M75.22 BICIPITAL TENDINITIS, LEFT SHOULDER: ICD-10-CM

## 2025-03-13 DIAGNOSIS — M81.0 AGE-RELATED OSTEOPOROSIS W/OUT CURRENT PATHOLOGICAL FRACTURE: ICD-10-CM

## 2025-03-13 DIAGNOSIS — M35.01 SICCA SYNDROME WITH KERATOCONJUNCTIVITIS: ICD-10-CM

## 2025-03-13 DIAGNOSIS — M15.9 POLYOSTEOARTHRITIS, UNSPECIFIED: ICD-10-CM

## 2025-03-13 DIAGNOSIS — G89.29 PAIN IN RIGHT KNEE: ICD-10-CM

## 2025-03-13 DIAGNOSIS — K11.1 HYPERTROPHY OF SALIVARY GLAND: ICD-10-CM

## 2025-03-13 DIAGNOSIS — M75.21 BICIPITAL TENDINITIS, RIGHT SHOULDER: ICD-10-CM

## 2025-03-13 DIAGNOSIS — M85.80 OTHER SPECIFIED DISORDERS OF BONE DENSITY AND STRUCTURE, UNSPECIFIED SITE: ICD-10-CM

## 2025-03-13 DIAGNOSIS — M75.51 BURSITIS OF RIGHT SHOULDER: ICD-10-CM

## 2025-03-13 DIAGNOSIS — M25.561 PAIN IN RIGHT KNEE: ICD-10-CM

## 2025-03-13 DIAGNOSIS — M79.7 FIBROMYALGIA: ICD-10-CM

## 2025-03-13 DIAGNOSIS — M25.562 PAIN IN RIGHT KNEE: ICD-10-CM

## 2025-03-13 DIAGNOSIS — M75.52 BURSITIS OF RIGHT SHOULDER: ICD-10-CM

## 2025-03-13 DIAGNOSIS — R76.0 RAISED ANTIBODY TITER: ICD-10-CM

## 2025-03-13 DIAGNOSIS — G89.29 LOW BACK PAIN, UNSPECIFIED: ICD-10-CM

## 2025-03-13 DIAGNOSIS — H04.123 DRY EYE SYNDROME OF BILATERAL LACRIMAL GLANDS: ICD-10-CM

## 2025-03-13 PROCEDURE — G2212 PROLONG OUTPT/OFFICE VIS: CPT

## 2025-03-13 PROCEDURE — 99215 OFFICE O/P EST HI 40 MIN: CPT

## 2025-03-13 PROCEDURE — G2211 COMPLEX E/M VISIT ADD ON: CPT

## 2025-03-16 RX ORDER — PREDNISOLONE ACETATE 10 MG/ML
1 SUSPENSION/ DROPS OPHTHALMIC
Qty: 5 | Refills: 0 | Status: DISCONTINUED | COMMUNITY
Start: 2025-01-16

## 2025-03-16 RX ORDER — ATORVASTATIN CALCIUM 20 MG/1
20 TABLET, FILM COATED ORAL
Qty: 90 | Refills: 0 | Status: DISCONTINUED | COMMUNITY
Start: 2024-10-28

## 2025-03-16 RX ORDER — MILNACIPRAN HYDROCHLORIDE 12.5-25-5
12.5 & 25 & 5 KIT ORAL
Qty: 1 | Refills: 0 | Status: ACTIVE | COMMUNITY
Start: 2025-03-16 | End: 1900-01-01

## 2025-03-16 RX ORDER — MILNACIPRAN HYDROCHLORIDE 50 MG/1
50 TABLET, FILM COATED ORAL
Qty: 180 | Refills: 0 | Status: ACTIVE | COMMUNITY
Start: 2025-03-16 | End: 1900-01-01

## 2025-03-16 RX ORDER — METFORMIN HYDROCHLORIDE 500 MG/1
500 TABLET, COATED ORAL
Qty: 180 | Refills: 0 | Status: DISCONTINUED | COMMUNITY
Start: 2024-11-18

## 2025-03-16 RX ORDER — AZITHROMYCIN 250 MG/1
250 TABLET, FILM COATED ORAL
Qty: 6 | Refills: 0 | Status: DISCONTINUED | COMMUNITY
Start: 2024-12-05

## 2025-03-16 RX ORDER — ALBUTEROL SULFATE 90 UG/1
108 (90 BASE) INHALANT RESPIRATORY (INHALATION)
Qty: 8 | Refills: 0 | Status: DISCONTINUED | COMMUNITY
Start: 2025-01-20

## 2025-03-16 RX ORDER — BENZONATATE 100 MG/1
100 CAPSULE ORAL
Qty: 20 | Refills: 0 | Status: DISCONTINUED | COMMUNITY
Start: 2024-12-05

## 2025-03-16 RX ORDER — METHYLPREDNISOLONE 4 MG/1
4 TABLET ORAL
Qty: 21 | Refills: 0 | Status: DISCONTINUED | COMMUNITY
Start: 2024-12-20

## 2025-03-18 RX ORDER — TERIPARATIDE 250 UG/ML
600 INJECTION, SOLUTION SUBCUTANEOUS
Qty: 3 | Refills: 0 | Status: ACTIVE | COMMUNITY
Start: 2025-03-16 | End: 1900-01-01

## 2025-04-14 NOTE — ED STATDOCS - DISCHARGE DATE

## 2025-04-15 ENCOUNTER — OFFICE (OUTPATIENT)
Dept: URBAN - METROPOLITAN AREA CLINIC 112 | Facility: CLINIC | Age: 70
Setting detail: OPHTHALMOLOGY
End: 2025-04-15
Payer: MEDICARE

## 2025-04-15 DIAGNOSIS — H40.31X2: ICD-10-CM

## 2025-04-15 DIAGNOSIS — H40.1122: ICD-10-CM

## 2025-04-15 PROBLEM — H40.052 OCULAR HYPERTENSION; LEFT EYE: Status: ACTIVE | Noted: 2025-04-15

## 2025-04-15 PROCEDURE — 92083 EXTENDED VISUAL FIELD XM: CPT | Performed by: OPHTHALMOLOGY

## 2025-04-15 PROCEDURE — 92250 FUNDUS PHOTOGRAPHY W/I&R: CPT | Performed by: OPHTHALMOLOGY

## 2025-04-15 PROCEDURE — 99214 OFFICE O/P EST MOD 30 MIN: CPT | Performed by: OPHTHALMOLOGY

## 2025-04-15 ASSESSMENT — REFRACTION_CURRENTRX
OS_CYLINDER: -1.25
OD_SPHERE: -0.75
OD_CYLINDER: -2.25
OD_AXIS: 102
OS_ADD: +3.00
OD_ADD: +3.00
OD_AXIS: 110
OD_OVR_VA: 20/
OS_AXIS: 071
OS_SPHERE: +0.50
OS_SPHERE: +0.75
OD_SPHERE: -1.50
OS_OVR_VA: 20/
OD_OVR_VA: 20/
OD_ADD: +2.50
OS_CYLINDER: -1.25
OS_AXIS: 079
OS_OVR_VA: 20/
OD_CYLINDER: -2.50
OS_ADD: +3.00

## 2025-04-15 ASSESSMENT — PACHYMETRY
OS_CT_CORRECTION: 1
OD_CT_UM: 516
OD_CT_CORRECTION: 2
OS_CT_UM: 523

## 2025-04-15 ASSESSMENT — TONOMETRY
OS_IOP_MMHG: 20
OD_IOP_MMHG: 21
OS_IOP_MMHG: 17
OD_IOP_MMHG: 17

## 2025-04-15 ASSESSMENT — KERATOMETRY
OD_AXISANGLE_DEGREES: 011
OD_K1POWER_DIOPTERS: 42.50
OD_K2POWER_DIOPTERS: 45.00
OS_K2POWER_DIOPTERS: 43.75
OS_AXISANGLE_DEGREES: 161
METHOD_AUTO_MANUAL: AUTO
OS_K1POWER_DIOPTERS: 42.75

## 2025-04-15 ASSESSMENT — REFRACTION_AUTOREFRACTION
OS_AXIS: 076
OD_SPHERE: -1.25
OS_CYLINDER: -1.25
OD_CYLINDER: -3.25
OS_SPHERE: +0.75
OD_AXIS: 103

## 2025-04-15 ASSESSMENT — REFRACTION_MANIFEST
OD_SPHERE: -1.50
OS_VA1: 20/20-1
OD_AXIS: 100
OS_CYLINDER: -0.50
OD_SPHERE: -1.00
OD_VA1: 20/25-2
OS_CYLINDER: -1.25
OD_CYLINDER: -2.75
OS_AXIS: 085
OS_ADD: +2.75
OD_ADD: +2.75
OS_SPHERE: +0.75
OD_CYLINDER: -2.75
OD_VA1: 20/40-2
OS_VA1: 20/25+2
OS_SPHERE: +0.50
OS_AXIS: 080
OD_AXIS: 105
OU_VA: 20/25

## 2025-04-15 ASSESSMENT — LID EXAM ASSESSMENTS
OD_BLEPHARITIS: RLL 1+
OS_BLEPHARITIS: LLL 1+

## 2025-04-15 ASSESSMENT — CONFRONTATIONAL VISUAL FIELD TEST (CVF)
OD_FINDINGS: FULL
OS_FINDINGS: FULL

## 2025-04-15 ASSESSMENT — VISUAL ACUITY
OS_BCVA: 20/30-1
OD_BCVA: 20/20

## 2025-04-15 ASSESSMENT — SUPERFICIAL PUNCTATE KERATITIS (SPK)
OS_SPK: T
OD_SPK: T

## 2025-04-30 ENCOUNTER — LABORATORY RESULT (OUTPATIENT)
Age: 70
End: 2025-04-30

## 2025-05-01 ENCOUNTER — APPOINTMENT (OUTPATIENT)
Dept: PULMONOLOGY | Facility: CLINIC | Age: 70
End: 2025-05-01
Payer: MEDICARE

## 2025-05-01 VITALS
HEART RATE: 90 BPM | HEIGHT: 60 IN | DIASTOLIC BLOOD PRESSURE: 66 MMHG | WEIGHT: 217 LBS | SYSTOLIC BLOOD PRESSURE: 126 MMHG | OXYGEN SATURATION: 98 % | RESPIRATION RATE: 16 BRPM | BODY MASS INDEX: 42.6 KG/M2

## 2025-05-01 DIAGNOSIS — R06.02 SHORTNESS OF BREATH: ICD-10-CM

## 2025-05-01 DIAGNOSIS — M35.01 SICCA SYNDROME WITH KERATOCONJUNCTIVITIS: ICD-10-CM

## 2025-05-01 PROCEDURE — 99215 OFFICE O/P EST HI 40 MIN: CPT

## 2025-05-01 PROCEDURE — G2211 COMPLEX E/M VISIT ADD ON: CPT

## 2025-05-20 ENCOUNTER — APPOINTMENT (OUTPATIENT)
Dept: PULMONOLOGY | Facility: CLINIC | Age: 70
End: 2025-05-20

## 2025-05-20 ENCOUNTER — OFFICE (OUTPATIENT)
Dept: URBAN - METROPOLITAN AREA CLINIC 112 | Facility: CLINIC | Age: 70
Setting detail: OPHTHALMOLOGY
End: 2025-05-20
Payer: MEDICARE

## 2025-05-20 DIAGNOSIS — H40.1122: ICD-10-CM

## 2025-05-20 DIAGNOSIS — H40.31X2: ICD-10-CM

## 2025-05-20 PROBLEM — H26.492 POSTERIOR CAPSULE OPACITY; LEFT EYE: Status: ACTIVE | Noted: 2025-05-20

## 2025-05-20 PROBLEM — H40.052 OCULAR HYPERTENSION; LEFT EYE: Status: ACTIVE | Noted: 2025-05-20

## 2025-05-20 PROCEDURE — 92012 INTRM OPH EXAM EST PATIENT: CPT | Performed by: OPHTHALMOLOGY

## 2025-05-20 ASSESSMENT — REFRACTION_MANIFEST
OD_AXIS: 105
OS_SPHERE: +0.75
OS_SPHERE: +0.50
OS_VA1: 20/20-1
OD_SPHERE: -1.00
OD_VA1: 20/25-2
OS_AXIS: 080
OS_VA1: 20/25+2
OU_VA: 20/25
OD_SPHERE: -1.50
OD_CYLINDER: -2.75
OS_ADD: +2.75
OD_ADD: +2.75
OS_AXIS: 085
OD_VA1: 20/40-2
OS_CYLINDER: -0.50
OD_CYLINDER: -2.75
OS_CYLINDER: -1.25
OD_AXIS: 100

## 2025-05-20 ASSESSMENT — REFRACTION_CURRENTRX
OD_SPHERE: -1.50
OS_OVR_VA: 20/
OD_CYLINDER: -2.50
OS_SPHERE: +0.50
OD_ADD: +3.00
OD_OVR_VA: 20/
OS_AXIS: 071
OS_SPHERE: +0.75
OD_SPHERE: -0.75
OS_ADD: +3.00
OS_ADD: +3.00
OD_AXIS: 110
OS_AXIS: 079
OD_CYLINDER: -2.25
OD_OVR_VA: 20/
OD_AXIS: 102
OS_CYLINDER: -1.25
OD_ADD: +2.50
OS_CYLINDER: -1.25
OS_OVR_VA: 20/

## 2025-05-20 ASSESSMENT — PACHYMETRY
OD_CT_CORRECTION: 2
OS_CT_CORRECTION: 1
OD_CT_UM: 516
OS_CT_UM: 523

## 2025-05-20 ASSESSMENT — VISUAL ACUITY
OD_BCVA: 20/15-1
OS_BCVA: 20/30-1

## 2025-05-20 ASSESSMENT — KERATOMETRY
METHOD_AUTO_MANUAL: AUTO
OS_AXISANGLE_DEGREES: 165
OD_K2POWER_DIOPTERS: 45.00
OS_K2POWER_DIOPTERS: 43.75
OD_AXISANGLE_DEGREES: 011
OS_K1POWER_DIOPTERS: 42.75
OD_K1POWER_DIOPTERS: 41.75

## 2025-05-20 ASSESSMENT — REFRACTION_AUTOREFRACTION
OS_CYLINDER: -1.50
OD_SPHERE: -1.00
OS_AXIS: 075
OD_AXIS: 102
OD_CYLINDER: -3.50
OS_SPHERE: +1.00

## 2025-05-20 ASSESSMENT — SUPERFICIAL PUNCTATE KERATITIS (SPK)
OD_SPK: T
OS_SPK: T

## 2025-05-20 ASSESSMENT — LID EXAM ASSESSMENTS
OS_BLEPHARITIS: LLL 1+
OD_BLEPHARITIS: RLL 1+

## 2025-05-20 ASSESSMENT — CONFRONTATIONAL VISUAL FIELD TEST (CVF)
OD_FINDINGS: FULL
OS_FINDINGS: FULL

## 2025-05-20 ASSESSMENT — TONOMETRY: OS_IOP_MMHG: 18

## 2025-05-21 ENCOUNTER — NON-APPOINTMENT (OUTPATIENT)
Age: 70
End: 2025-05-21

## 2025-05-21 ENCOUNTER — APPOINTMENT (OUTPATIENT)
Dept: CARDIOLOGY | Facility: CLINIC | Age: 70
End: 2025-05-21
Payer: MEDICARE

## 2025-05-21 VITALS
HEART RATE: 64 BPM | HEIGHT: 60 IN | BODY MASS INDEX: 42.8 KG/M2 | WEIGHT: 218 LBS | OXYGEN SATURATION: 97 % | SYSTOLIC BLOOD PRESSURE: 128 MMHG | DIASTOLIC BLOOD PRESSURE: 70 MMHG

## 2025-05-21 DIAGNOSIS — R06.02 SHORTNESS OF BREATH: ICD-10-CM

## 2025-05-21 DIAGNOSIS — I10 ESSENTIAL (PRIMARY) HYPERTENSION: ICD-10-CM

## 2025-05-21 DIAGNOSIS — Z71.89 OTHER SPECIFIED COUNSELING: ICD-10-CM

## 2025-05-21 DIAGNOSIS — R07.9 CHEST PAIN, UNSPECIFIED: ICD-10-CM

## 2025-05-21 DIAGNOSIS — E78.5 HYPERLIPIDEMIA, UNSPECIFIED: ICD-10-CM

## 2025-05-21 DIAGNOSIS — E66.01 MORBID (SEVERE) OBESITY DUE TO EXCESS CALORIES: ICD-10-CM

## 2025-05-21 PROCEDURE — 93000 ELECTROCARDIOGRAM COMPLETE: CPT

## 2025-05-21 PROCEDURE — 99214 OFFICE O/P EST MOD 30 MIN: CPT | Mod: 25

## 2025-05-21 RX ORDER — SEMAGLUTIDE 1.34 MG/ML
2 INJECTION, SOLUTION SUBCUTANEOUS
Refills: 0 | Status: ACTIVE | COMMUNITY

## 2025-05-21 RX ORDER — FLUTICASONE PROPIONATE 50 UG/1
50 SPRAY, METERED NASAL DAILY
Qty: 1 | Refills: 5 | Status: DISCONTINUED | COMMUNITY
Start: 2025-05-15 | End: 2025-05-21

## 2025-05-21 RX ORDER — AZELASTINE 137 UG/1
137 SPRAY, METERED NASAL
Qty: 1 | Refills: 3 | Status: DISCONTINUED | COMMUNITY
Start: 2025-05-15 | End: 2025-05-21

## 2025-05-23 ENCOUNTER — APPOINTMENT (OUTPATIENT)
Dept: CT IMAGING | Facility: HOSPITAL | Age: 70
End: 2025-05-23

## 2025-06-10 ENCOUNTER — LABORATORY RESULT (OUTPATIENT)
Age: 70
End: 2025-06-10

## 2025-06-16 ENCOUNTER — NON-APPOINTMENT (OUTPATIENT)
Age: 70
End: 2025-06-16

## 2025-06-17 ENCOUNTER — APPOINTMENT (OUTPATIENT)
Dept: CARDIOLOGY | Facility: CLINIC | Age: 70
End: 2025-06-17
Payer: MEDICARE

## 2025-06-17 PROCEDURE — 93306 TTE W/DOPPLER COMPLETE: CPT

## 2025-06-23 ENCOUNTER — APPOINTMENT (OUTPATIENT)
Dept: RHEUMATOLOGY | Facility: CLINIC | Age: 70
End: 2025-06-23

## 2025-06-24 ENCOUNTER — APPOINTMENT (OUTPATIENT)
Dept: CT IMAGING | Facility: CLINIC | Age: 70
End: 2025-06-24

## 2025-07-18 ENCOUNTER — APPOINTMENT (OUTPATIENT)
Dept: RHEUMATOLOGY | Facility: CLINIC | Age: 70
End: 2025-07-18

## 2025-07-18 VITALS
RESPIRATION RATE: 17 BRPM | WEIGHT: 218 LBS | SYSTOLIC BLOOD PRESSURE: 140 MMHG | OXYGEN SATURATION: 91 % | BODY MASS INDEX: 42.8 KG/M2 | HEART RATE: 64 BPM | DIASTOLIC BLOOD PRESSURE: 80 MMHG | HEIGHT: 60 IN | TEMPERATURE: 98.2 F

## 2025-07-18 PROBLEM — Z51.81 ENCOUNTER FOR MONITORING TERIPARATIDE THERAPY: Status: ACTIVE | Noted: 2025-07-18

## 2025-07-18 PROBLEM — M79.641 PAIN IN BOTH HANDS: Status: ACTIVE | Noted: 2025-07-18

## 2025-07-18 PROCEDURE — 99215 OFFICE O/P EST HI 40 MIN: CPT

## 2025-07-18 PROCEDURE — G2212 PROLONG OUTPT/OFFICE VIS: CPT

## 2025-07-18 PROCEDURE — G2211 COMPLEX E/M VISIT ADD ON: CPT

## 2025-07-24 ENCOUNTER — APPOINTMENT (OUTPATIENT)
Dept: PULMONOLOGY | Facility: CLINIC | Age: 70
End: 2025-07-24

## 2025-07-26 RX ORDER — PREGABALIN 25 MG/1
25 CAPSULE ORAL
Qty: 60 | Refills: 3 | Status: ACTIVE | COMMUNITY
Start: 2025-07-26 | End: 1900-01-01

## 2025-08-27 ENCOUNTER — OFFICE (OUTPATIENT)
Dept: URBAN - METROPOLITAN AREA CLINIC 112 | Facility: CLINIC | Age: 70
Setting detail: OPHTHALMOLOGY
End: 2025-08-27
Payer: MEDICARE

## 2025-08-27 DIAGNOSIS — H01.005: ICD-10-CM

## 2025-08-27 DIAGNOSIS — H35.033: ICD-10-CM

## 2025-08-27 DIAGNOSIS — H26.491: ICD-10-CM

## 2025-08-27 DIAGNOSIS — H16.223: ICD-10-CM

## 2025-08-27 DIAGNOSIS — H40.31X2: ICD-10-CM

## 2025-08-27 DIAGNOSIS — H40.1122: ICD-10-CM

## 2025-08-27 DIAGNOSIS — H01.002: ICD-10-CM

## 2025-08-27 PROBLEM — H40.052 OCULAR HYPERTENSION; LEFT EYE: Status: ACTIVE | Noted: 2025-08-27

## 2025-08-27 PROCEDURE — 92014 COMPRE OPH EXAM EST PT 1/>: CPT | Performed by: OPHTHALMOLOGY

## 2025-08-27 ASSESSMENT — REFRACTION_CURRENTRX
OD_OVR_VA: 20/
OS_ADD: +3.00
OD_SPHERE: -1.50
OS_AXIS: 071
OS_CYLINDER: -1.25
OS_ADD: +3.00
OS_CYLINDER: -1.25
OS_OVR_VA: 20/
OD_AXIS: 110
OS_OVR_VA: 20/
OD_CYLINDER: -2.25
OD_AXIS: 102
OD_ADD: +3.00
OS_SPHERE: +0.75
OD_OVR_VA: 20/
OS_AXIS: 079
OD_ADD: +2.50
OS_SPHERE: +0.50
OD_CYLINDER: -2.50
OD_SPHERE: -0.75

## 2025-08-27 ASSESSMENT — REFRACTION_AUTOREFRACTION
OD_CYLINDER: -3.25
OS_CYLINDER: -1.25
OD_SPHERE: -2.00
OD_AXIS: 109
OS_SPHERE: +1.00
OS_AXIS: 085

## 2025-08-27 ASSESSMENT — LID EXAM ASSESSMENTS
OS_BLEPHARITIS: LLL 1+
OD_BLEPHARITIS: RLL 1+

## 2025-08-27 ASSESSMENT — REFRACTION_MANIFEST
OS_SPHERE: +0.75
OS_SPHERE: +0.50
OU_VA: 20/25
OD_SPHERE: -1.50
OS_ADD: +2.75
OS_VA1: 20/20-1
OD_VA1: 20/25-2
OS_SPHERE: +1.00
OD_AXIS: 105
OD_CYLINDER: -2.75
OD_SPHERE: -1.00
OD_AXIS: 100
OS_VA1: 20/25+2
OD_AXIS: 110
OD_CYLINDER: -2.75
OS_AXIS: 085
OS_CYLINDER: -1.25
OS_CYLINDER: -1.25
OS_AXIS: 080
OD_ADD: +2.75
OS_CYLINDER: -0.50
OD_CYLINDER: -3.25
OD_SPHERE: -1.00
OD_VA1: 20/40-2
OS_AXIS: 085

## 2025-08-27 ASSESSMENT — TONOMETRY
OD_IOP_MMHG: 21
OS_IOP_MMHG: 17
OD_IOP_MMHG: 20
OS_IOP_MMHG: 18

## 2025-08-27 ASSESSMENT — PACHYMETRY
OD_CT_CORRECTION: 2
OS_CT_CORRECTION: 1
OS_CT_UM: 523
OD_CT_UM: 516

## 2025-08-27 ASSESSMENT — CONFRONTATIONAL VISUAL FIELD TEST (CVF)
OD_FINDINGS: FULL
OS_FINDINGS: FULL

## 2025-08-27 ASSESSMENT — KERATOMETRY
OD_K1POWER_DIOPTERS: 42.25
OD_K2POWER_DIOPTERS: 45.00
OS_AXISANGLE_DEGREES: 172
OS_K1POWER_DIOPTERS: 43.00
METHOD_AUTO_MANUAL: AUTO
OD_AXISANGLE_DEGREES: 017
OS_K2POWER_DIOPTERS: 44.00

## 2025-08-27 ASSESSMENT — SUPERFICIAL PUNCTATE KERATITIS (SPK)
OD_SPK: T
OS_SPK: T

## 2025-08-27 ASSESSMENT — VISUAL ACUITY
OD_BCVA: 20/20
OS_BCVA: 20/50-1

## 2025-09-08 ENCOUNTER — APPOINTMENT (OUTPATIENT)
Dept: PULMONOLOGY | Facility: CLINIC | Age: 70
End: 2025-09-08
Payer: MEDICARE

## 2025-09-08 VITALS
HEART RATE: 72 BPM | RESPIRATION RATE: 16 BRPM | SYSTOLIC BLOOD PRESSURE: 128 MMHG | OXYGEN SATURATION: 97 % | DIASTOLIC BLOOD PRESSURE: 74 MMHG

## 2025-09-08 VITALS — BODY MASS INDEX: 44.92 KG/M2 | WEIGHT: 214 LBS | HEIGHT: 58 IN

## 2025-09-08 DIAGNOSIS — R05.3 CHRONIC COUGH: ICD-10-CM

## 2025-09-08 PROCEDURE — 99213 OFFICE O/P EST LOW 20 MIN: CPT

## 2025-09-08 PROCEDURE — G2211 COMPLEX E/M VISIT ADD ON: CPT

## 2025-09-08 RX ORDER — SEMAGLUTIDE 1.34 MG/ML
INJECTION, SOLUTION SUBCUTANEOUS
Refills: 0 | Status: ACTIVE | COMMUNITY

## (undated) DEVICE — MASK PROC EAR LOOP

## (undated) DEVICE — PACK IV START WITH CHG

## (undated) DEVICE — FORCEP BIOPSY ENDOSCOPIC 2.8MM DISP

## (undated) DEVICE — BITE BLOCK ADULT 20 X 27MM (GREEN)

## (undated) DEVICE — FORCEP RADIAL JAW 4 W NDL 2.4MM 2.8MM 240CM ORANGE DISP

## (undated) DEVICE — SOL IRR BAG H2O 1000ML

## (undated) DEVICE — VENODYNE/SCD SLEEVE CALF MEDIUM

## (undated) DEVICE — SOL IRR BAG NS 0.9% 1000ML

## (undated) DEVICE — SYR LUER SLIP TIP 50CC

## (undated) DEVICE — SYR SLIP 10CC

## (undated) DEVICE — FORCEP RADIAL JAW 4 240CM DISP

## (undated) DEVICE — UNDERPAD LINEN SAVER 23 X 36"

## (undated) DEVICE — WARMING BLANKET FULL ADULT

## (undated) DEVICE — SOL BAG NS 0.9% 1000ML

## (undated) DEVICE — CATH IV SAFE BC 22G X 1" (BLUE)

## (undated) DEVICE — GOWN IMPERV XL

## (undated) DEVICE — SYR IV FLUSH SALINE 10ML 30/TY

## (undated) DEVICE — DRSG 2X2

## (undated) DEVICE — TUBING ALARIS PUMP MODULE NON-DEHP

## (undated) DEVICE — DENTURE CUP PINK

## (undated) DEVICE — TUBING IV EXTENSION MACRO W CLAVE 7"

## (undated) DEVICE — SENSOR O2 FINGER ADULT

## (undated) DEVICE — DRSG CURITY GAUZE SPONGE 4 X 4" 12-PLY NON-STERILE